# Patient Record
Sex: MALE | Race: AMERICAN INDIAN OR ALASKA NATIVE | HISPANIC OR LATINO
[De-identification: names, ages, dates, MRNs, and addresses within clinical notes are randomized per-mention and may not be internally consistent; named-entity substitution may affect disease eponyms.]

---

## 2020-02-12 ENCOUNTER — HOSPITAL (OUTPATIENT)
Dept: OTHER | Age: 25
End: 2020-02-12

## 2020-02-12 PROCEDURE — 99283 EMERGENCY DEPT VISIT LOW MDM: CPT | Performed by: EMERGENCY MEDICINE

## 2022-08-06 ENCOUNTER — APPOINTMENT (OUTPATIENT)
Dept: CT IMAGING | Facility: HOSPITAL | Age: 27
End: 2022-08-06
Attending: EMERGENCY MEDICINE

## 2022-08-06 ENCOUNTER — HOSPITAL ENCOUNTER (INPATIENT)
Facility: HOSPITAL | Age: 27
LOS: 2 days | Discharge: HOME | End: 2022-08-08
Attending: EMERGENCY MEDICINE | Admitting: INTERNAL MEDICINE

## 2022-08-06 DIAGNOSIS — K57.92 DIVERTICULITIS: Primary | ICD-10-CM

## 2022-08-06 LAB
ALBUMIN SERPL-MCNC: 3 G/DL (ref 3.4–5)
ALP LIVER SERPL-CCNC: 77 U/L
ALT SERPL-CCNC: 34 U/L
ANION GAP SERPL CALC-SCNC: 8 MMOL/L (ref 0–18)
AST SERPL-CCNC: 21 U/L (ref 15–37)
BASOPHILS # BLD AUTO: 0.05 X10(3) UL (ref 0–0.2)
BASOPHILS NFR BLD AUTO: 0.7 %
BILIRUB DIRECT SERPL-MCNC: <0.1 MG/DL (ref 0–0.2)
BILIRUB SERPL-MCNC: 0.2 MG/DL (ref 0.1–2)
BILIRUB UR QL: NEGATIVE
BUN BLD-MCNC: 7 MG/DL (ref 7–18)
BUN/CREAT SERPL: 9.6 (ref 10–20)
CALCIUM BLD-MCNC: 9.5 MG/DL (ref 8.5–10.1)
CHLORIDE SERPL-SCNC: 108 MMOL/L (ref 98–112)
CLARITY UR: CLEAR
CO2 SERPL-SCNC: 25 MMOL/L (ref 21–32)
COLOR UR: YELLOW
CREAT BLD-MCNC: 0.73 MG/DL
DEPRECATED RDW RBC AUTO: 40 FL (ref 35.1–46.3)
EOSINOPHIL # BLD AUTO: 0.29 X10(3) UL (ref 0–0.7)
EOSINOPHIL NFR BLD AUTO: 4.1 %
ERYTHROCYTE [DISTWIDTH] IN BLOOD BY AUTOMATED COUNT: 12.4 % (ref 11–15)
GFR SERPLBLD BASED ON 1.73 SQ M-ARVRAT: 127 ML/MIN/1.73M2 (ref 60–?)
GLUCOSE BLD-MCNC: 91 MG/DL (ref 70–99)
GLUCOSE UR-MCNC: NEGATIVE MG/DL
HCT VFR BLD AUTO: 42.7 %
HGB BLD-MCNC: 13.7 G/DL
HGB UR QL STRIP.AUTO: NEGATIVE
IMM GRANULOCYTES # BLD AUTO: 0.04 X10(3) UL (ref 0–1)
IMM GRANULOCYTES NFR BLD: 0.6 %
KETONES UR-MCNC: NEGATIVE MG/DL
LEUKOCYTE ESTERASE UR QL STRIP.AUTO: NEGATIVE
LIPASE SERPL-CCNC: 77 U/L (ref 73–393)
LYMPHOCYTES # BLD AUTO: 2.46 X10(3) UL (ref 1–4)
LYMPHOCYTES NFR BLD AUTO: 35.2 %
MCH RBC QN AUTO: 28.1 PG (ref 26–34)
MCHC RBC AUTO-ENTMCNC: 32.1 G/DL (ref 31–37)
MCV RBC AUTO: 87.7 FL
MONOCYTES # BLD AUTO: 0.5 X10(3) UL (ref 0.1–1)
MONOCYTES NFR BLD AUTO: 7.2 %
NEUTROPHILS # BLD AUTO: 3.65 X10 (3) UL (ref 1.5–7.7)
NEUTROPHILS # BLD AUTO: 3.65 X10(3) UL (ref 1.5–7.7)
NEUTROPHILS NFR BLD AUTO: 52.2 %
NITRITE UR QL STRIP.AUTO: NEGATIVE
OSMOLALITY SERPL CALC.SUM OF ELEC: 290 MOSM/KG (ref 275–295)
PH UR: 6 [PH] (ref 5–8)
PLATELET # BLD AUTO: 432 10(3)UL (ref 150–450)
POTASSIUM SERPL-SCNC: 3.7 MMOL/L (ref 3.5–5.1)
PROT SERPL-MCNC: 9.2 G/DL (ref 6.4–8.2)
PROT UR-MCNC: NEGATIVE MG/DL
RBC # BLD AUTO: 4.87 X10(6)UL
SARS-COV-2 RNA RESP QL NAA+PROBE: NOT DETECTED
SODIUM SERPL-SCNC: 141 MMOL/L (ref 136–145)
SP GR UR STRIP: 1.02 (ref 1–1.03)
UROBILINOGEN UR STRIP-ACNC: 0.2
WBC # BLD AUTO: 7 X10(3) UL (ref 4–11)

## 2022-08-06 PROCEDURE — 80048 BASIC METABOLIC PNL TOTAL CA: CPT | Performed by: EMERGENCY MEDICINE

## 2022-08-06 PROCEDURE — 99285 EMERGENCY DEPT VISIT HI MDM: CPT

## 2022-08-06 PROCEDURE — 80076 HEPATIC FUNCTION PANEL: CPT | Performed by: EMERGENCY MEDICINE

## 2022-08-06 PROCEDURE — 96361 HYDRATE IV INFUSION ADD-ON: CPT

## 2022-08-06 PROCEDURE — 81003 URINALYSIS AUTO W/O SCOPE: CPT | Performed by: EMERGENCY MEDICINE

## 2022-08-06 PROCEDURE — 83690 ASSAY OF LIPASE: CPT | Performed by: EMERGENCY MEDICINE

## 2022-08-06 PROCEDURE — 74177 CT ABD & PELVIS W/CONTRAST: CPT | Performed by: EMERGENCY MEDICINE

## 2022-08-06 PROCEDURE — 96365 THER/PROPH/DIAG IV INF INIT: CPT

## 2022-08-06 PROCEDURE — 85025 COMPLETE CBC W/AUTO DIFF WBC: CPT | Performed by: EMERGENCY MEDICINE

## 2022-08-06 RX ORDER — ONDANSETRON 2 MG/ML
4 INJECTION INTRAMUSCULAR; INTRAVENOUS EVERY 6 HOURS PRN
Status: DISCONTINUED | OUTPATIENT
Start: 2022-08-06 | End: 2022-08-08

## 2022-08-06 RX ORDER — DEXTROSE AND SODIUM CHLORIDE 5; .45 G/100ML; G/100ML
INJECTION, SOLUTION INTRAVENOUS CONTINUOUS
Status: DISCONTINUED | OUTPATIENT
Start: 2022-08-06 | End: 2022-08-08

## 2022-08-06 RX ORDER — IBUPROFEN 200 MG
200 TABLET ORAL EVERY 6 HOURS PRN
COMMUNITY
End: 2022-08-08

## 2022-08-06 RX ORDER — HEPARIN SODIUM 5000 [USP'U]/ML
7500 INJECTION, SOLUTION INTRAVENOUS; SUBCUTANEOUS EVERY 8 HOURS
Status: DISCONTINUED | OUTPATIENT
Start: 2022-08-06 | End: 2022-08-08

## 2022-08-06 RX ORDER — MORPHINE SULFATE 2 MG/ML
1 INJECTION, SOLUTION INTRAMUSCULAR; INTRAVENOUS EVERY 4 HOURS PRN
Status: DISCONTINUED | OUTPATIENT
Start: 2022-08-06 | End: 2022-08-08

## 2022-08-06 NOTE — ED QUICK NOTES
Orders for admission, patient is aware of plan and ready to go upstairs. Any questions, please call ED RN mandu at extension [phone].      Patient Covid vaccination status: Unvaccinated     COVID Test Ordered in ED: Rapid SARS-CoV-2 by PCR    COVID Suspicion at Admission: N/A    Running Infusions:      Mental Status/LOC at time of transport: a/ox4    Other pertinent information:   CIWA score: N/A   NIH score:  N/A

## 2022-08-07 LAB
ANION GAP SERPL CALC-SCNC: 6 MMOL/L (ref 0–18)
BUN BLD-MCNC: 7 MG/DL (ref 7–18)
BUN/CREAT SERPL: 8.5 (ref 10–20)
CALCIUM BLD-MCNC: 9 MG/DL (ref 8.5–10.1)
CHLORIDE SERPL-SCNC: 108 MMOL/L (ref 98–112)
CO2 SERPL-SCNC: 27 MMOL/L (ref 21–32)
CREAT BLD-MCNC: 0.82 MG/DL
DEPRECATED RDW RBC AUTO: 40 FL (ref 35.1–46.3)
ERYTHROCYTE [DISTWIDTH] IN BLOOD BY AUTOMATED COUNT: 12.3 % (ref 11–15)
GFR SERPLBLD BASED ON 1.73 SQ M-ARVRAT: 123 ML/MIN/1.73M2 (ref 60–?)
GLUCOSE BLD-MCNC: 113 MG/DL (ref 70–99)
HCT VFR BLD AUTO: 37.9 %
HGB BLD-MCNC: 12.2 G/DL
MCH RBC QN AUTO: 28.6 PG (ref 26–34)
MCHC RBC AUTO-ENTMCNC: 32.2 G/DL (ref 31–37)
MCV RBC AUTO: 88.8 FL
OSMOLALITY SERPL CALC.SUM OF ELEC: 291 MOSM/KG (ref 275–295)
PLATELET # BLD AUTO: 318 10(3)UL (ref 150–450)
POTASSIUM SERPL-SCNC: 3.6 MMOL/L (ref 3.5–5.1)
RBC # BLD AUTO: 4.27 X10(6)UL
SODIUM SERPL-SCNC: 141 MMOL/L (ref 136–145)
WBC # BLD AUTO: 8.6 X10(3) UL (ref 4–11)

## 2022-08-07 PROCEDURE — 85027 COMPLETE CBC AUTOMATED: CPT | Performed by: INTERNAL MEDICINE

## 2022-08-07 PROCEDURE — 80048 BASIC METABOLIC PNL TOTAL CA: CPT | Performed by: INTERNAL MEDICINE

## 2022-08-07 NOTE — PLAN OF CARE
Joseph Client is alert and oriented. He is voiding. Ambulating independently. He denies any pain or nausea/vomiting. He is receiving IVF and IV Antibiotics. Remote telemetry with No calls received. Resting comfortably at this time. Discharge plan is for home when medically cleared.

## 2022-08-08 VITALS
WEIGHT: 310.38 LBS | OXYGEN SATURATION: 97 % | BODY MASS INDEX: 44.44 KG/M2 | RESPIRATION RATE: 16 BRPM | TEMPERATURE: 98 F | SYSTOLIC BLOOD PRESSURE: 123 MMHG | DIASTOLIC BLOOD PRESSURE: 82 MMHG | HEART RATE: 62 BPM | HEIGHT: 70 IN

## 2022-08-08 RX ORDER — AMOXICILLIN AND CLAVULANATE POTASSIUM 875; 125 MG/1; MG/1
875 TABLET, FILM COATED ORAL EVERY 12 HOURS SCHEDULED
Status: DISCONTINUED | OUTPATIENT
Start: 2022-08-08 | End: 2022-08-08

## 2022-08-08 RX ORDER — AMOXICILLIN AND CLAVULANATE POTASSIUM 875; 125 MG/1; MG/1
875 TABLET, FILM COATED ORAL EVERY 12 HOURS SCHEDULED
Qty: 10 TABLET | Refills: 0 | Status: SHIPPED | OUTPATIENT
Start: 2022-08-08

## 2022-08-08 NOTE — PLAN OF CARE
Christine Christina is doing well. No pain/nausea/vomiting. Tolerating clear liquids. Receiving IVF and antibiotics IVPB. He is voiding freely and ambulating independently. All needs are within reach. He calls if needed. The plan is home with oral antibiotics if cleared by GI.

## 2022-08-08 NOTE — DIETARY NOTE
Educated pt on low fiber diet for 2-3 weeks with gradual progression to high fiber diet as tolerated. Handout and contact information provided.       [address], [address] [facility]   Clinical Dietitian [id number]

## 2022-08-08 NOTE — DISCHARGE SUMMARY
[facility]    Discharge Summary    Tammy Hall Patient Status:  Inpatient    1995 MRN [id number]   Location [facility] 4W/SW/SE Attending Florence Das MD   [facility] Day # 2 PCP No primary care provider on file. Date of Admission: 2022   Date of Discharge: 2022    Admitting Diagnosis: Diverticulitis [K57.92]    Disposition: Home    Discharge Diagnosis: . Principal Problem:    Diverticulitis      Hospital Course:   Reason for Admission: Abd pain    Discharge Physical Exam: General appearance: alert, appears stated age and cooperative  Pulmonary:  clear to auscultation bilaterally  Cardiovascular: S1, S2 normal, no murmur, click, rub or gallop, regular rate and rhythm, S1, S2 normal  Abdominal: soft, non-tender; bowel sounds normal; no masses,  no organomegaly  Extremities: extremities normal, atraumatic, no cyanosis or edema  Pulses: 2+ and symmetric  Skin: Skin color, texture, turgor normal. No rashes or lesions  Neurologic: Grossly normal    Hospital Course: Pt started on IV abx for diverticulitis doing better , advance diet as tolerated and DC home    Complications: None    Consultants  Chat With All Active Members    Provider Role Specialty    Poncho Cevallos MD  Consulting Physician  Silas Varghese MD  Consulting Physician  Kiko Le MD  Consulting Physician  Corie Aguilera MD  Consulting Physician  Corinne Hardy MD Consulting Physician  Internal Medicine    Elsie Morley MD  Consulting Physician  SURGERY, PLASTIC              Discharge Plan:   Discharge Condition: Stable    Current Discharge Medication List            Discharge Diet: As tolerated    Discharge Activity: As tolerated    Follow up:                Emre Jacobs MD, Cortes Little MD  2022

## 2022-09-01 ENCOUNTER — HOSPITAL ENCOUNTER (EMERGENCY)
Facility: HOSPITAL | Age: 27
Discharge: HOME | End: 2022-09-01
Attending: EMERGENCY MEDICINE

## 2022-09-01 VITALS
RESPIRATION RATE: 16 BRPM | SYSTOLIC BLOOD PRESSURE: 184 MMHG | HEART RATE: 102 BPM | OXYGEN SATURATION: 95 % | TEMPERATURE: 98 F | DIASTOLIC BLOOD PRESSURE: 79 MMHG

## 2022-09-01 DIAGNOSIS — N30.01 ACUTE CYSTITIS WITH HEMATURIA: Primary | ICD-10-CM

## 2022-09-01 LAB
BILIRUB UR QL: NEGATIVE
GLUCOSE UR-MCNC: NEGATIVE MG/DL
KETONES UR-MCNC: NEGATIVE MG/DL
NITRITE UR QL STRIP.AUTO: NEGATIVE
PH UR: 6 [PH] (ref 5–8)
RBC #/AREA URNS AUTO: >10 /HPF
RBC #/AREA URNS AUTO: >10 /HPF
SP GR UR STRIP: 1.02 (ref 1–1.03)
UROBILINOGEN UR STRIP-ACNC: 0.2
WBC #/AREA URNS AUTO: >50 /HPF
WBC #/AREA URNS AUTO: >50 /HPF
WBC CLUMPS UR QL AUTO: PRESENT /HPF
WBC CLUMPS UR QL AUTO: PRESENT /HPF

## 2022-09-01 PROCEDURE — 81015 MICROSCOPIC EXAM OF URINE: CPT | Performed by: EMERGENCY MEDICINE

## 2022-09-01 PROCEDURE — 87077 CULTURE AEROBIC IDENTIFY: CPT | Performed by: EMERGENCY MEDICINE

## 2022-09-01 PROCEDURE — 87491 CHLMYD TRACH DNA AMP PROBE: CPT | Performed by: EMERGENCY MEDICINE

## 2022-09-01 PROCEDURE — 99283 EMERGENCY DEPT VISIT LOW MDM: CPT

## 2022-09-01 PROCEDURE — 87186 SC STD MICRODIL/AGAR DIL: CPT | Performed by: EMERGENCY MEDICINE

## 2022-09-01 PROCEDURE — 87086 URINE CULTURE/COLONY COUNT: CPT | Performed by: EMERGENCY MEDICINE

## 2022-09-01 PROCEDURE — 87591 N.GONORRHOEAE DNA AMP PROB: CPT | Performed by: EMERGENCY MEDICINE

## 2022-09-01 PROCEDURE — 81001 URINALYSIS AUTO W/SCOPE: CPT | Performed by: EMERGENCY MEDICINE

## 2022-09-01 RX ORDER — CEPHALEXIN 500 MG/1
500 CAPSULE ORAL 3 TIMES DAILY
Qty: 21 CAPSULE | Refills: 0 | Status: SHIPPED | OUTPATIENT
Start: 2022-09-01 | End: 2022-09-08

## 2022-09-01 NOTE — ED INITIAL ASSESSMENT (HPI)
Mena Rogers is here for evaluation of urinary frequency starting yesterday. Today he noticed pink-tinged urine, painful urination, and mucus in his urine.

## 2022-09-02 LAB
C TRACH DNA SPEC QL NAA+PROBE: NEGATIVE
N GONORRHOEA DNA SPEC QL NAA+PROBE: NEGATIVE

## 2023-01-05 ENCOUNTER — HOSPITAL ENCOUNTER (INPATIENT)
Facility: HOSPITAL | Age: 28
LOS: 2 days | Discharge: HOME | End: 2023-01-08
Attending: HOSPITALIST | Admitting: HOSPITALIST

## 2023-01-05 ENCOUNTER — HOSPITAL ENCOUNTER (INPATIENT)
Facility: HOSPITAL | Age: 28
LOS: 2 days | Discharge: HOME | End: 2023-01-08
Attending: HOSPITALIST

## 2023-01-05 DIAGNOSIS — K57.92 ACUTE DIVERTICULITIS: Primary | ICD-10-CM

## 2023-01-05 LAB
BASOPHILS # BLD AUTO: 0.08 X10(3) UL (ref 0–0.2)
BASOPHILS NFR BLD AUTO: 0.6 %
BILIRUB UR QL: NEGATIVE
CLARITY UR: CLEAR
COLOR UR: YELLOW
DEPRECATED RDW RBC AUTO: 38.5 FL (ref 35.1–46.3)
EOSINOPHIL # BLD AUTO: 0.35 X10(3) UL (ref 0–0.7)
EOSINOPHIL NFR BLD AUTO: 2.7 %
ERYTHROCYTE [DISTWIDTH] IN BLOOD BY AUTOMATED COUNT: 12.7 % (ref 11–15)
GLUCOSE UR-MCNC: NEGATIVE MG/DL
HCT VFR BLD AUTO: 45.6 %
HGB BLD-MCNC: 15.5 G/DL
HGB UR QL STRIP.AUTO: NEGATIVE
IMM GRANULOCYTES # BLD AUTO: 0.05 X10(3) UL (ref 0–1)
IMM GRANULOCYTES NFR BLD: 0.4 %
KETONES UR-MCNC: NEGATIVE MG/DL
LEUKOCYTE ESTERASE UR QL STRIP.AUTO: NEGATIVE
LYMPHOCYTES # BLD AUTO: 3.1 X10(3) UL (ref 1–4)
LYMPHOCYTES NFR BLD AUTO: 24.1 %
MCH RBC QN AUTO: 28.2 PG (ref 26–34)
MCHC RBC AUTO-ENTMCNC: 34 G/DL (ref 31–37)
MCV RBC AUTO: 83.1 FL
MONOCYTES # BLD AUTO: 0.57 X10(3) UL (ref 0.1–1)
MONOCYTES NFR BLD AUTO: 4.4 %
NEUTROPHILS # BLD AUTO: 8.71 X10 (3) UL (ref 1.5–7.7)
NEUTROPHILS # BLD AUTO: 8.71 X10(3) UL (ref 1.5–7.7)
NEUTROPHILS NFR BLD AUTO: 67.8 %
NITRITE UR QL STRIP.AUTO: NEGATIVE
PH UR: 5 [PH] (ref 5–8)
PLATELET # BLD AUTO: 404 10(3)UL (ref 150–450)
PROT UR-MCNC: NEGATIVE MG/DL
RBC # BLD AUTO: 5.49 X10(6)UL
SP GR UR STRIP: 1.02 (ref 1–1.03)
UROBILINOGEN UR STRIP-ACNC: <2
VIT C UR-MCNC: NEGATIVE MG/DL
WBC # BLD AUTO: 12.9 X10(3) UL (ref 4–11)

## 2023-01-06 ENCOUNTER — APPOINTMENT (OUTPATIENT)
Dept: CT IMAGING | Facility: HOSPITAL | Age: 28
End: 2023-01-06
Attending: NURSE PRACTITIONER

## 2023-01-06 PROBLEM — K57.92 ACUTE DIVERTICULITIS: Status: ACTIVE | Noted: 2023-01-06

## 2023-01-06 LAB
ALBUMIN SERPL-MCNC: 3.7 G/DL (ref 3.4–5)
ALBUMIN/GLOB SERPL: 0.6 {RATIO} (ref 1–2)
ALP LIVER SERPL-CCNC: 79 U/L
ALT SERPL-CCNC: 32 U/L
ANION GAP SERPL CALC-SCNC: 9 MMOL/L (ref 0–18)
AST SERPL-CCNC: 22 U/L (ref 15–37)
BILIRUB SERPL-MCNC: 0.6 MG/DL (ref 0.1–2)
BUN BLD-MCNC: 8 MG/DL (ref 7–18)
BUN/CREAT SERPL: 9.2 (ref 10–20)
CALCIUM BLD-MCNC: 9.6 MG/DL (ref 8.5–10.1)
CHLORIDE SERPL-SCNC: 103 MMOL/L (ref 98–112)
CO2 SERPL-SCNC: 25 MMOL/L (ref 21–32)
CREAT BLD-MCNC: 0.87 MG/DL
GFR SERPLBLD BASED ON 1.73 SQ M-ARVRAT: 121 ML/MIN/1.73M2 (ref 60–?)
GLOBULIN PLAS-MCNC: 5.8 G/DL (ref 2.8–4.4)
GLUCOSE BLD-MCNC: 97 MG/DL (ref 70–99)
LIPASE SERPL-CCNC: 89 U/L (ref 73–393)
OSMOLALITY SERPL CALC.SUM OF ELEC: 282 MOSM/KG (ref 275–295)
POTASSIUM SERPL-SCNC: 3.4 MMOL/L (ref 3.5–5.1)
PROT SERPL-MCNC: 9.5 G/DL (ref 6.4–8.2)
SARS-COV-2 RNA RESP QL NAA+PROBE: NOT DETECTED
SODIUM SERPL-SCNC: 137 MMOL/L (ref 136–145)

## 2023-01-06 PROCEDURE — 99222 1ST HOSP IP/OBS MODERATE 55: CPT | Performed by: HOSPITALIST

## 2023-01-06 PROCEDURE — 74177 CT ABD & PELVIS W/CONTRAST: CPT | Performed by: NURSE PRACTITIONER

## 2023-01-06 PROCEDURE — 99254 IP/OBS CNSLTJ NEW/EST MOD 60: CPT | Performed by: SURGERY

## 2023-01-06 PROCEDURE — 99254 IP/OBS CNSLTJ NEW/EST MOD 60: CPT | Performed by: INTERNAL MEDICINE

## 2023-01-06 RX ORDER — POTASSIUM CHLORIDE 20 MEQ/1
40 TABLET, EXTENDED RELEASE ORAL ONCE
Status: COMPLETED | OUTPATIENT
Start: 2023-01-06 | End: 2023-01-06

## 2023-01-06 RX ORDER — HEPARIN SODIUM 5000 [USP'U]/ML
7500 INJECTION, SOLUTION INTRAVENOUS; SUBCUTANEOUS EVERY 12 HOURS SCHEDULED
Status: DISCONTINUED | OUTPATIENT
Start: 2023-01-06 | End: 2023-01-08

## 2023-01-06 RX ORDER — HYDROMORPHONE HYDROCHLORIDE 1 MG/ML
0.5 INJECTION, SOLUTION INTRAMUSCULAR; INTRAVENOUS; SUBCUTANEOUS
Status: DISCONTINUED | OUTPATIENT
Start: 2023-01-06 | End: 2023-01-07

## 2023-01-06 RX ORDER — ACETAMINOPHEN 325 MG/1
650 TABLET ORAL EVERY 6 HOURS PRN
COMMUNITY
End: 2023-01-08

## 2023-01-06 RX ORDER — SODIUM CHLORIDE 9 MG/ML
INJECTION, SOLUTION INTRAVENOUS CONTINUOUS
Status: DISCONTINUED | OUTPATIENT
Start: 2023-01-06 | End: 2023-01-08

## 2023-01-06 RX ORDER — PROCHLORPERAZINE EDISYLATE 5 MG/ML
5 INJECTION INTRAMUSCULAR; INTRAVENOUS EVERY 8 HOURS PRN
Status: DISCONTINUED | OUTPATIENT
Start: 2023-01-06 | End: 2023-01-08

## 2023-01-06 RX ORDER — ONDANSETRON 2 MG/ML
4 INJECTION INTRAMUSCULAR; INTRAVENOUS EVERY 6 HOURS PRN
Status: DISCONTINUED | OUTPATIENT
Start: 2023-01-06 | End: 2023-01-08

## 2023-01-06 RX ORDER — HYDROMORPHONE HYDROCHLORIDE 1 MG/ML
0.3 INJECTION, SOLUTION INTRAMUSCULAR; INTRAVENOUS; SUBCUTANEOUS EVERY 4 HOURS PRN
Status: DISCONTINUED | OUTPATIENT
Start: 2023-01-06 | End: 2023-01-07

## 2023-01-06 NOTE — ED QUICK NOTES
Orders for admission, patient is aware of plan and ready to go upstairs. Any questions, please call ED RN Marita at extension [phone].      Patient Covid vaccination status: Unvaccinated     COVID Test Ordered in ED: Rapid SARS-CoV-2 by PCR    COVID Suspicion at Admission: N/A    Running Infusions:  None    Mental Status/LOC at time of transport: AOx4    Other pertinent information:   CIWA score: N/A   NIH score:  N/A

## 2023-01-06 NOTE — ED INITIAL ASSESSMENT (HPI)
Pt c/o of lower abdominal pain x 1 week no diarrhea reports feels like hes having a diverticulitis flare up

## 2023-01-06 NOTE — H&P
Robert Agapito    PATIENT'S NAME: Osbaldo Harding   ATTENDING PHYSICIAN: Yordan Castañeda MD   PATIENT ACCOUNT#:   [de-identified]    LOCATION:  [address] [place] [address] Road #:   [id number]       YOB: 1995  ADMISSION DATE:       01/05/2023    HISTORY AND PHYSICAL EXAMINATION    DATE OF EXAMINATION:  01/06/2023    CHIEF COMPLAINT:  Abdominal pain and constipation. 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 He was admitted to our hospital in August of this year with diverticulitis. He was treated with IV antibiotics and gradually improved. He was to follow up with GI for scope as an outpatient but because of his lack of health insurance, he is unable to afford it. He did not follow up and he says since that time he has had recurrent episodes of left lower quadrant abdominal pain. He said when they would occur, he would decrease his p.o. intake and typically the discomfort would improve within 1 day or 2. However, at this time he has been suffering with the pain for the last week or more. It has been across his lower abdomen, primarily in the left side but radiating to his back as well. He has had no fever or chills. No nausea or vomiting. He said he was constipated over the last couple of days. He feels bloated. He said 1 week ago he started changing his diet to a more bland diet; however, when he did not improve, he stopped eating altogether and said he has basically had no p.o. intake over the last 3 days, but the symptoms have continued to persist.  He received pain medications in the emergency room and when I saw him, he was quite comfortable. Evaluation in the ER included a CT scan of the abdomen and pelvis which revealed sigmoid diverticulitis with marked inflammatory changes and a sinus tract without definite fistulization. There was secondary bladder inflammation and moderate left hydronephrosis/hydroureter. Labs included a glucose of 97, sodium of 137, potassium of 3.4, chloride 103, CO2 of 25, BUN of 8 with a creatinine of 0.87, calcium 9.6, anion gap of 9. Liver function tests were essentially normal except for an elevated globulin with an elevated total protein of 9.5. White count was 12.9 with a hemoglobin of 15 and a platelet count of 986,160. There were 68% neutrophils. Urinalysis was clear without signs of infection. He was started on IV Zosyn in the emergency room and admitted for further evaluation and treatment. PAST MEDICAL HISTORY:  Significant for diverticulosis with episode of diverticulitis in 2022. At that time, the patient's labs revealed a perforated left lower quadrant diverticulitis. He was seen by GI and Surgical Services at that time and managed conservatively. He has had recurrent episodes of abdominal pain, which he felt was most likely related to his diverticulitis, but he has managed this with altering his diet for 1 day or 2 in the past.  COVID-19 pneumonia in , hospitalized for 3 weeks at [facility].  The patient denies any surgery and specifically denies any history of diabetes, high blood pressure, asthma or heart problems. He likely has obstructive sleep apnea. He has a BMI of 43 and snores when he sleeps. MEDICATIONS:  Prior to admission none, except Tylenol 325 mg. Patient was taking 2 every 6 hours as needed for pain. ALLERGIES:  No known drug allergies. FAMILY HISTORY:  His mother is 64 and had a CVA in the past and hypertension. His father  at 61 of sepsis. He also had hypertension and diabetes. His aunt on his father's side had breast cancer. SOCIAL HISTORY:  The patient does not smoke currently. He apparently smoked briefly as a teenager. Drinks alcohol about once a month. No history of drug use. 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     REVIEW OF SYSTEMS:  Twelve systems were reviewed. The patient reports that until 1 week ago, his appetite had been good. He denies any fever or chills. No urinary symptoms. No diarrhea, melena, or hematochezia. He has been constipated over the last couple of days. There are otherwise no additional pertinent positives or negatives on the 12-point review of systems except as listed in the History of Present Illness. PHYSICAL EXAMINATION:    VITAL SIGNS:  Temperature was 97.8, pulse 60, respiratory rate 20, blood pressure 136/88, O2 saturation 97% on room air. HEENT:  Normocephalic, atraumatic. Pupils equal, reactive. Sclerae anicteric. There is no sinus tenderness. Mucous membranes were moist.  NECK:  Supple. Oropharynx was crowded. LUNGS:  Decreased breath sounds bilaterally secondary to body habitus but easy respiratory excursions. HEART:  Regular rate and rhythm. Normal S1, S2.  ABDOMEN:  Obese, soft. Bowel sounds were hypoactive. There may have been some mild distention. Difficult to tell because of the patient's body habitus. There was tenderness to palpation primarily in the left lower quadrant without guarding or rebound. EXTREMITIES:  No clubbing, cyanosis, calf tenderness, palpable cords, or edema. SKIN:  Warm and dry without any significant rashes. There were multiple tattoos. NEUROLOGIC:  The patient was awake, alert, oriented x3 with no focal motor or sensory deficits. PSYCHIATRIC:  His mood and affect were pleasant and cooperative. BACK:  There was no costovertebral angle tenderness. LABORATORY DATA:  Previously mentioned. ASSESSMENT AND PLAN:    1. Diverticulitis with fistulous tract. No obvious abscess. Continue IV Zosyn which was started in the emergency room. Await evaluation by GI and Surgery. As no obvious fluid collection was seen, I do not think Interventional Radiology can be of much assistance here.   Unfortunately, this poor gentleman is without health insurance and is very concerned about the cost of everything. We will ask Social Work to evaluate as well. 2.   Left hydroureter/hydronephrosis, appears to be secondary to the inflammatory colonic process. The patient's BUN and creatinine are normal.  He has had no issues with urinating and urinalysis is clear. There does not appear to be any kind of a fistulous tract to the bladder at this point. Continue to monitor closely. May require urology evaluation. 3.   Body mass index of 43. Suspect underlying sleep apnea. Patient is not in a position to afford sleep study as an outpatient. We will however place on continuous pulse ox while in the hospital, so that oxygen can be given at night or when the patient is sleeping if necessary. Try to limit narcotics if possible. 4.   DVT prophylaxis. Subcutaneous heparin. 5.   The patient's current clinical status and proposed treatment plan was discussed with him. All of his questions were answered, and he agreed with the plan of care as outlined above.      Dictated By Rene Antunez MD  d: 01/06/2023 06:23:20  t: 01/06/2023 06:32:45  Job [id number]  [facility]/

## 2023-01-06 NOTE — PLAN OF CARE
New admit from ED this morning. Patient c/o lower abdominal pain, dilaudid given. Potassium replaced. IVF at 125 ml/hr. Vitals stable. Continue to monitor. Problem: Patient Centered Care  Goal: Patient preferences are identified and integrated in the patient's plan of care  Description: Interventions:  - What would you like us to know as we care for you?  - Provide timely, complete, and accurate information to patient/family  - Incorporate patient and family knowledge, values, beliefs, and cultural backgrounds into the planning and delivery of care  - Encourage patient/family to participate in care and decision-making at the level they choose  - Honor patient and family perspectives and choices  Outcome: Progressing     Problem: PAIN - ADULT  Goal: Verbalizes/displays adequate comfort level or patient's stated pain goal  Description: INTERVENTIONS:  - Encourage pt to monitor pain and request assistance  - Assess pain using appropriate pain scale  - Administer analgesics based on type and severity of pain and evaluate response  - Implement non-pharmacological measures as appropriate and evaluate response  - Consider cultural and social influences on pain and pain management  - Manage/alleviate anxiety  - Utilize distraction and/or relaxation techniques  - Monitor for opioid side effects  - Notify MD/LIP if interventions unsuccessful or patient reports new pain  - Anticipate increased pain with activity and pre-medicate as appropriate  Outcome: Progressing     Problem: SAFETY ADULT - FALL  Goal: Free from fall injury  Description: INTERVENTIONS:  - Assess pt frequently for physical needs  - Identify cognitive and physical deficits and behaviors that affect risk of falls.   - [place] fall precautions as indicated by assessment.  - Educate pt/family on patient safety including physical limitations  - Instruct pt to call for assistance with activity based on assessment  - Modify environment to reduce risk of injury  - Provide assistive devices as appropriate  - Consider OT/PT consult to assist with strengthening/mobility  - Encourage toileting schedule  Outcome: Progressing     Problem: GASTROINTESTINAL - ADULT  Goal: Minimal or absence of nausea and vomiting  Description: INTERVENTIONS:  - Maintain adequate hydration with IV or PO as ordered and tolerated  - Nasogastric tube to low intermittent suction as ordered  - Evaluate effectiveness of ordered antiemetic medications  - Provide nonpharmacologic comfort measures as appropriate  - Advance diet as tolerated, if ordered  - Obtain nutritional consult as needed  - Evaluate fluid balance  Outcome: Progressing  Goal: Maintains or returns to baseline bowel function  Description: INTERVENTIONS:  - Assess bowel function  - Maintain adequate hydration with IV or PO as ordered and tolerated  - Evaluate effectiveness of GI medications  - Encourage mobilization and activity  - Obtain nutritional consult as needed  - Establish a toileting routine/schedule  - Consider collaborating with pharmacy to review patient's medication profile  Outcome: Progressing  Goal: Maintains adequate nutritional intake (undernourished)  Description: INTERVENTIONS:  - Monitor percentage of each meal consumed  - Identify factors contributing to decreased intake, treat as appropriate  - Assist with meals as needed  - Monitor I&O, WT and lab values  - Obtain nutritional consult as needed  - Optimize oral hygiene and moisture  - Encourage food from home; allow for food preferences  - Enhance eating environment  Outcome: Progressing     Problem: METABOLIC/FLUID AND ELECTROLYTES - ADULT  Goal: Electrolytes maintained within normal limits  Description: INTERVENTIONS:  - Monitor labs and rhythm and assess patient for signs and symptoms of electrolyte imbalances  - Administer electrolyte replacement as ordered  - Monitor response to electrolyte replacements, including rhythm and repeat lab results as appropriate  - Fluid restriction as ordered  - Instruct patient on fluid and nutrition restrictions as appropriate  Outcome: Progressing     Problem: SKIN/TISSUE INTEGRITY - ADULT  Goal: Skin integrity remains intact  Description: INTERVENTIONS  - Assess and document risk factors for pressure ulcer development  - Assess and document skin integrity  - Monitor for areas of redness and/or skin breakdown  - Initiate interventions, skin care algorithm/standards of care as needed  Outcome: Progressing

## 2023-01-06 NOTE — PROGRESS NOTES
27M with a PMH of recurrent colon diverticulitis who presents with lower abdominal pain x 1 week. Urology consulted for left hydronephrosis and hydroureter on CT scan. UA negative. CT AP + IVC 1/6/23: acute sigmoid diverticulitis with possible fistula. Interval development of moderate left hydronephrosis down to the level of the inflamed sigmoid colon with asymmetric bladder wall thickening. Patient is currently hemodynamically stable, pain has improved since admission, and primary [facility] plans for Gen Surg consultation. I reviewed images. Agree with continuing current medical therapy for diverticulitis and expectant mgmt at this time for hydronephrosis as this should hopefully improve once diverticulitis is addressed.  will be on standby for pre-op cysto, left ureteral stent if general surgery plans for surgical intervention. Full  consult note to follow.

## 2023-01-06 NOTE — CM/SW NOTE
CECIL received MDO for assistance due to pt not having any insurance. CECIL notified [facility] ([id number]), left voicemail. CECIL emailed Lawerhari Heard at [address].           CECIL/RODGER to remain available for support and/or discharge planning.      HILLARY Perez, LSW   x [phone]

## 2023-01-07 LAB
ALBUMIN SERPL-MCNC: 3 G/DL (ref 3.4–5)
ALBUMIN/GLOB SERPL: 0.6 {RATIO} (ref 1–2)
ALP LIVER SERPL-CCNC: 66 U/L
ALT SERPL-CCNC: 28 U/L
ANION GAP SERPL CALC-SCNC: 5 MMOL/L (ref 0–18)
AST SERPL-CCNC: 18 U/L (ref 15–37)
BASOPHILS # BLD AUTO: 0.05 X10(3) UL (ref 0–0.2)
BASOPHILS NFR BLD AUTO: 0.5 %
BILIRUB SERPL-MCNC: 0.5 MG/DL (ref 0.1–2)
BUN BLD-MCNC: 8 MG/DL (ref 7–18)
BUN/CREAT SERPL: 8.8 (ref 10–20)
CALCIUM BLD-MCNC: 8.9 MG/DL (ref 8.5–10.1)
CHLORIDE SERPL-SCNC: 108 MMOL/L (ref 98–112)
CO2 SERPL-SCNC: 28 MMOL/L (ref 21–32)
CREAT BLD-MCNC: 0.91 MG/DL
DEPRECATED RDW RBC AUTO: 38.6 FL (ref 35.1–46.3)
EOSINOPHIL # BLD AUTO: 0.32 X10(3) UL (ref 0–0.7)
EOSINOPHIL NFR BLD AUTO: 3.3 %
ERYTHROCYTE [DISTWIDTH] IN BLOOD BY AUTOMATED COUNT: 12.6 % (ref 11–15)
GFR SERPLBLD BASED ON 1.73 SQ M-ARVRAT: 118 ML/MIN/1.73M2 (ref 60–?)
GLOBULIN PLAS-MCNC: 5.1 G/DL (ref 2.8–4.4)
GLUCOSE BLD-MCNC: 80 MG/DL (ref 70–99)
HCT VFR BLD AUTO: 39.3 %
HGB BLD-MCNC: 13.1 G/DL
IMM GRANULOCYTES # BLD AUTO: 0.05 X10(3) UL (ref 0–1)
IMM GRANULOCYTES NFR BLD: 0.5 %
LYMPHOCYTES # BLD AUTO: 2.75 X10(3) UL (ref 1–4)
LYMPHOCYTES NFR BLD AUTO: 28.6 %
MAGNESIUM SERPL-MCNC: 2.1 MG/DL (ref 1.6–2.6)
MCH RBC QN AUTO: 28.4 PG (ref 26–34)
MCHC RBC AUTO-ENTMCNC: 33.3 G/DL (ref 31–37)
MCV RBC AUTO: 85.2 FL
MONOCYTES # BLD AUTO: 0.51 X10(3) UL (ref 0.1–1)
MONOCYTES NFR BLD AUTO: 5.3 %
NEUTROPHILS # BLD AUTO: 5.93 X10 (3) UL (ref 1.5–7.7)
NEUTROPHILS # BLD AUTO: 5.93 X10(3) UL (ref 1.5–7.7)
NEUTROPHILS NFR BLD AUTO: 61.8 %
OSMOLALITY SERPL CALC.SUM OF ELEC: 289 MOSM/KG (ref 275–295)
PHOSPHATE SERPL-MCNC: 3.5 MG/DL (ref 2.5–4.9)
PLATELET # BLD AUTO: 333 10(3)UL (ref 150–450)
POTASSIUM SERPL-SCNC: 3.5 MMOL/L (ref 3.5–5.1)
POTASSIUM SERPL-SCNC: 3.5 MMOL/L (ref 3.5–5.1)
PROT SERPL-MCNC: 8.1 G/DL (ref 6.4–8.2)
RBC # BLD AUTO: 4.61 X10(6)UL
SODIUM SERPL-SCNC: 141 MMOL/L (ref 136–145)
WBC # BLD AUTO: 9.6 X10(3) UL (ref 4–11)

## 2023-01-07 PROCEDURE — 99232 SBSQ HOSP IP/OBS MODERATE 35: CPT | Performed by: INTERNAL MEDICINE

## 2023-01-07 PROCEDURE — 99222 1ST HOSP IP/OBS MODERATE 55: CPT | Performed by: UROLOGY

## 2023-01-07 PROCEDURE — 99232 SBSQ HOSP IP/OBS MODERATE 35: CPT | Performed by: SURGERY

## 2023-01-07 PROCEDURE — 99233 SBSQ HOSP IP/OBS HIGH 50: CPT | Performed by: HOSPITALIST

## 2023-01-07 RX ORDER — HYDROCODONE BITARTRATE AND ACETAMINOPHEN 5; 325 MG/1; MG/1
1 TABLET ORAL EVERY 4 HOURS PRN
Status: DISCONTINUED | OUTPATIENT
Start: 2023-01-07 | End: 2023-01-08

## 2023-01-07 NOTE — PLAN OF CARE
No acute changes in the patient's status at this time. Patient reporting some mild abdominal discomfort. He reports not having eaten anything for the last 4 days and said he he has an appetite and is hungry. Dilaudid given x1 with adequate pain relief per the patient's report. IVF and IV antibiotics continued as ordered. Patient has been self care in the room. Problem: Patient Centered Care  Goal: Patient preferences are identified and integrated in the patient's plan of care  Description: Interventions:  - What would you like us to know as we care for you?  I live at home with my partner and my daughter.  - Provide timely, complete, and accurate information to patient/family  - Incorporate patient and family knowledge, values, beliefs, and cultural backgrounds into the planning and delivery of care  - Encourage patient/family to participate in care and decision-making at the level they choose  - Honor patient and family perspectives and choices  Outcome: Progressing     Problem: PAIN - ADULT  Goal: Verbalizes/displays adequate comfort level or patient's stated pain goal  Description: INTERVENTIONS:  - Encourage pt to monitor pain and request assistance  - Assess pain using appropriate pain scale  - Administer analgesics based on type and severity of pain and evaluate response  - Implement non-pharmacological measures as appropriate and evaluate response  - Consider cultural and social influences on pain and pain management  - Manage/alleviate anxiety  - Utilize distraction and/or relaxation techniques  - Monitor for opioid side effects  - Notify MD/LIP if interventions unsuccessful or patient reports new pain  - Anticipate increased pain with activity and pre-medicate as appropriate  Outcome: Progressing     Problem: SAFETY ADULT - FALL  Goal: Free from fall injury  Description: INTERVENTIONS:  - Assess pt frequently for physical needs  - Identify cognitive and physical deficits and behaviors that affect risk of falls.  - [place] fall precautions as indicated by assessment.  - Educate pt/family on patient safety including physical limitations  - Instruct pt to call for assistance with activity based on assessment  - Modify environment to reduce risk of injury  - Provide assistive devices as appropriate  - Consider OT/PT consult to assist with strengthening/mobility  - Encourage toileting schedule  Outcome: Progressing     Problem: GASTROINTESTINAL - ADULT  Goal: Minimal or absence of nausea and vomiting  Description: INTERVENTIONS:  - Maintain adequate hydration with IV or PO as ordered and tolerated  - Nasogastric tube to low intermittent suction as ordered  - Evaluate effectiveness of ordered antiemetic medications  - Provide nonpharmacologic comfort measures as appropriate  - Advance diet as tolerated, if ordered  - Obtain nutritional consult as needed  - Evaluate fluid balance  Outcome: Progressing  Goal: Maintains or returns to baseline bowel function  Description: INTERVENTIONS:  - Assess bowel function  - Maintain adequate hydration with IV or PO as ordered and tolerated  - Evaluate effectiveness of GI medications  - Encourage mobilization and activity  - Obtain nutritional consult as needed  - Establish a toileting routine/schedule  - Consider collaborating with pharmacy to review patient's medication profile  Outcome: Progressing  Goal: Maintains adequate nutritional intake (undernourished)  Description: INTERVENTIONS:  - Monitor percentage of each meal consumed  - Identify factors contributing to decreased intake, treat as appropriate  - Assist with meals as needed  - Monitor I&O, WT and lab values  - Obtain nutritional consult as needed  - Optimize oral hygiene and moisture  - Encourage food from home; allow for food preferences  - Enhance eating environment  Outcome: Progressing     Problem: METABOLIC/FLUID AND ELECTROLYTES - ADULT  Goal: Electrolytes maintained within normal limits  Description: INTERVENTIONS:  - Monitor labs and rhythm and assess patient for signs and symptoms of electrolyte imbalances  - Administer electrolyte replacement as ordered  - Monitor response to electrolyte replacements, including rhythm and repeat lab results as appropriate  - Fluid restriction as ordered  - Instruct patient on fluid and nutrition restrictions as appropriate  Outcome: Progressing     Problem: SKIN/TISSUE INTEGRITY - ADULT  Goal: Skin integrity remains intact  Description: INTERVENTIONS  - Assess and document risk factors for pressure ulcer development  - Assess and document skin integrity  - Monitor for areas of redness and/or skin breakdown  - Initiate interventions, skin care algorithm/standards of care as needed  Outcome: Progressing

## 2023-01-07 NOTE — PLAN OF CARE
Advanced to full liquid diet per order,  Diet advanced to soft. Given norco for complaints of back and mild abd pain. IVF maintained. Patient had an unwitnessed 1 soft bm reported today. Problem: Patient Centered Care  Goal: Patient preferences are identified and integrated in the patient's plan of care  Description: Interventions:  - What would you like us to know as we care for you? Had a work injury 10 years that resulted in surgery and back pain.   - Provide timely, complete, and accurate information to patient/family  - Incorporate patient and family knowledge, values, beliefs, and cultural backgrounds into the planning and delivery of care  - Encourage patient/family to participate in care and decision-making at the level they choose  - Honor patient and family perspectives and choices  Outcome: Progressing     Problem: PAIN - ADULT  Goal: Verbalizes/displays adequate comfort level or patient's stated pain goal  Description: INTERVENTIONS:  - Encourage pt to monitor pain and request assistance  - Assess pain using appropriate pain scale  - Administer analgesics based on type and severity of pain and evaluate response  - Implement non-pharmacological measures as appropriate and evaluate response  - Consider cultural and social influences on pain and pain management  - Manage/alleviate anxiety  - Utilize distraction and/or relaxation techniques  - Monitor for opioid side effects  - Notify MD/LIP if interventions unsuccessful or patient reports new pain  - Anticipate increased pain with activity and pre-medicate as appropriate  Outcome: Progressing     Problem: GASTROINTESTINAL - ADULT  Goal: Minimal or absence of nausea and vomiting  Description: INTERVENTIONS:  - Maintain adequate hydration with IV or PO as ordered and tolerated  - Nasogastric tube to low intermittent suction as ordered  - Evaluate effectiveness of ordered antiemetic medications  - Provide nonpharmacologic comfort measures as appropriate  - Advance diet as tolerated, if ordered  - Obtain nutritional consult as needed  - Evaluate fluid balance  Outcome: Progressing

## 2023-01-08 VITALS
OXYGEN SATURATION: 98 % | WEIGHT: 296.88 LBS | RESPIRATION RATE: 16 BRPM | BODY MASS INDEX: 43.97 KG/M2 | DIASTOLIC BLOOD PRESSURE: 71 MMHG | SYSTOLIC BLOOD PRESSURE: 119 MMHG | HEART RATE: 57 BPM | TEMPERATURE: 98 F | HEIGHT: 69 IN

## 2023-01-08 PROCEDURE — 99231 SBSQ HOSP IP/OBS SF/LOW 25: CPT | Performed by: INTERNAL MEDICINE

## 2023-01-08 PROCEDURE — 99239 HOSP IP/OBS DSCHRG MGMT >30: CPT | Performed by: HOSPITALIST

## 2023-01-08 PROCEDURE — 99232 SBSQ HOSP IP/OBS MODERATE 35: CPT | Performed by: SURGERY

## 2023-01-08 RX ORDER — HYDROCODONE BITARTRATE AND ACETAMINOPHEN 5; 325 MG/1; MG/1
1 TABLET ORAL EVERY 6 HOURS PRN
Qty: 10 TABLET | Refills: 0 | Status: SHIPPED | OUTPATIENT
Start: 2023-01-08

## 2023-01-08 RX ORDER — AMOXICILLIN AND CLAVULANATE POTASSIUM 875; 125 MG/1; MG/1
1 TABLET, FILM COATED ORAL 2 TIMES DAILY
Qty: 28 TABLET | Refills: 0 | Status: SHIPPED | OUTPATIENT
Start: 2023-01-08 | End: 2023-01-22

## 2023-01-08 NOTE — PLAN OF CARE
No acute changes in the patient's status overnight. Patient reported that his appetite is back and he is anticipating ordering breakfast very early and is hopeful for discharge later this morning. Patient noted some mild abdominal discomfort, but did not want any prn pain medications. He is in good spirits and pleasant. He has been independent and ambulatory within his room. Problem: Patient Centered Care  Goal: Patient preferences are identified and integrated in the patient's plan of care  Description: Interventions:  - What would you like us to know as we care for you?  I live at home with my family.  - Provide timely, complete, and accurate information to patient/family  - Incorporate patient and family knowledge, values, beliefs, and cultural backgrounds into the planning and delivery of care  - Encourage patient/family to participate in care and decision-making at the level they choose  - Honor patient and family perspectives and choices  Outcome: Progressing     Problem: PAIN - ADULT  Goal: Verbalizes/displays adequate comfort level or patient's stated pain goal  Description: INTERVENTIONS:  - Encourage pt to monitor pain and request assistance  - Assess pain using appropriate pain scale  - Administer analgesics based on type and severity of pain and evaluate response  - Implement non-pharmacological measures as appropriate and evaluate response  - Consider cultural and social influences on pain and pain management  - Manage/alleviate anxiety  - Utilize distraction and/or relaxation techniques  - Monitor for opioid side effects  - Notify MD/LIP if interventions unsuccessful or patient reports new pain  - Anticipate increased pain with activity and pre-medicate as appropriate  Outcome: Progressing     Problem: SAFETY ADULT - FALL  Goal: Free from fall injury  Description: INTERVENTIONS:  - Assess pt frequently for physical needs  - Identify cognitive and physical deficits and behaviors that affect risk of falls.  - [place] fall precautions as indicated by assessment.  - Educate pt/family on patient safety including physical limitations  - Instruct pt to call for assistance with activity based on assessment  - Modify environment to reduce risk of injury  - Provide assistive devices as appropriate  - Consider OT/PT consult to assist with strengthening/mobility  - Encourage toileting schedule  Outcome: Progressing     Problem: GASTROINTESTINAL - ADULT  Goal: Minimal or absence of nausea and vomiting  Description: INTERVENTIONS:  - Maintain adequate hydration with IV or PO as ordered and tolerated  - Nasogastric tube to low intermittent suction as ordered  - Evaluate effectiveness of ordered antiemetic medications  - Provide nonpharmacologic comfort measures as appropriate  - Advance diet as tolerated, if ordered  - Obtain nutritional consult as needed  - Evaluate fluid balance  Outcome: Progressing  Goal: Maintains or returns to baseline bowel function  Description: INTERVENTIONS:  - Assess bowel function  - Maintain adequate hydration with IV or PO as ordered and tolerated  - Evaluate effectiveness of GI medications  - Encourage mobilization and activity  - Obtain nutritional consult as needed  - Establish a toileting routine/schedule  - Consider collaborating with pharmacy to review patient's medication profile  Outcome: Progressing  Goal: Maintains adequate nutritional intake (undernourished)  Description: INTERVENTIONS:  - Monitor percentage of each meal consumed  - Identify factors contributing to decreased intake, treat as appropriate  - Assist with meals as needed  - Monitor I&O, WT and lab values  - Obtain nutritional consult as needed  - Optimize oral hygiene and moisture  - Encourage food from home; allow for food preferences  - Enhance eating environment  Outcome: Progressing     Problem: METABOLIC/FLUID AND ELECTROLYTES - ADULT  Goal: Electrolytes maintained within normal limits  Description: INTERVENTIONS:  - Monitor labs and rhythm and assess patient for signs and symptoms of electrolyte imbalances  - Administer electrolyte replacement as ordered  - Monitor response to electrolyte replacements, including rhythm and repeat lab results as appropriate  - Fluid restriction as ordered  - Instruct patient on fluid and nutrition restrictions as appropriate  Outcome: Progressing     Problem: SKIN/TISSUE INTEGRITY - ADULT  Goal: Skin integrity remains intact  Description: INTERVENTIONS  - Assess and document risk factors for pressure ulcer development  - Assess and document skin integrity  - Monitor for areas of redness and/or skin breakdown  - Initiate interventions, skin care algorithm/standards of care as needed  Outcome: Progressing

## 2023-01-08 NOTE — CM/SW NOTE
Received MDO for insurance issues. Spoke w/ patient, he has applied for Medicaid but does not qualify as he is over income. He has looked into private insurance but monthly cost is too expensive. Patient lives in [place] and has access to [employer] [employer] system. Explained patient will need to establish a PCP through the [employer] [employer] system and they would refer him to their GI clinic. [facility] can provide sliding scale & low cost f/u care. Info for [facility] added to patient's AVS. Patient expressed understanding, no other discharge needs identified.     Bo Levo, [address]

## 2023-01-08 NOTE — DISCHARGE INSTRUCTIONS
Please contact [facility] to establish PCP  [facility]  [address] [facility], [address]

## 2023-01-08 NOTE — PLAN OF CARE
VSS. No complaints of pain. IV zosyn dose for am administered. Problem: Patient Centered Care  Goal: Patient preferences are identified and integrated in the patient's plan of care  Description: Interventions:  - What would you like us to know as we care for you? I have trouble getting funds together.  - Provide timely, complete, and accurate information to patient/family  - Incorporate patient and family knowledge, values, beliefs, and cultural backgrounds into the planning and delivery of care  - Encourage patient/family to participate in care and decision-making at the level they choose  - Honor patient and family perspectives and choices  Outcome: Completed     Problem: PAIN - ADULT  Goal: Verbalizes/displays adequate comfort level or patient's stated pain goal  Description: INTERVENTIONS:  - Encourage pt to monitor pain and request assistance  - Assess pain using appropriate pain scale  - Administer analgesics based on type and severity of pain and evaluate response  - Implement non-pharmacological measures as appropriate and evaluate response  - Consider cultural and social influences on pain and pain management  - Manage/alleviate anxiety  - Utilize distraction and/or relaxation techniques  - Monitor for opioid side effects  - Notify MD/LIP if interventions unsuccessful or patient reports new pain  - Anticipate increased pain with activity and pre-medicate as appropriate  Outcome: Completed     Problem: SAFETY ADULT - FALL  Goal: Free from fall injury  Description: INTERVENTIONS:  - Assess pt frequently for physical needs  - Identify cognitive and physical deficits and behaviors that affect risk of falls.   - [place] fall precautions as indicated by assessment.  - Educate pt/family on patient safety including physical limitations  - Instruct pt to call for assistance with activity based on assessment  - Modify environment to reduce risk of injury  - Provide assistive devices as appropriate  - Consider OT/PT consult to assist with strengthening/mobility  - Encourage toileting schedule  Outcome: Completed     Problem: GASTROINTESTINAL - ADULT  Goal: Minimal or absence of nausea and vomiting  Description: INTERVENTIONS:  - Maintain adequate hydration with IV or PO as ordered and tolerated  - Nasogastric tube to low intermittent suction as ordered  - Evaluate effectiveness of ordered antiemetic medications  - Provide nonpharmacologic comfort measures as appropriate  - Advance diet as tolerated, if ordered  - Obtain nutritional consult as needed  - Evaluate fluid balance  Outcome: Completed  Goal: Maintains or returns to baseline bowel function  Description: INTERVENTIONS:  - Assess bowel function  - Maintain adequate hydration with IV or PO as ordered and tolerated  - Evaluate effectiveness of GI medications  - Encourage mobilization and activity  - Obtain nutritional consult as needed  - Establish a toileting routine/schedule  - Consider collaborating with pharmacy to review patient's medication profile  Outcome: Completed  Goal: Maintains adequate nutritional intake (undernourished)  Description: INTERVENTIONS:  - Monitor percentage of each meal consumed  - Identify factors contributing to decreased intake, treat as appropriate  - Assist with meals as needed  - Monitor I&O, WT and lab values  - Obtain nutritional consult as needed  - Optimize oral hygiene and moisture  - Encourage food from home; allow for food preferences  - Enhance eating environment  Outcome: Completed     Problem: METABOLIC/FLUID AND ELECTROLYTES - ADULT  Goal: Electrolytes maintained within normal limits  Description: INTERVENTIONS:  - Monitor labs and rhythm and assess patient for signs and symptoms of electrolyte imbalances  - Administer electrolyte replacement as ordered  - Monitor response to electrolyte replacements, including rhythm and repeat lab results as appropriate  - Fluid restriction as ordered  - Instruct patient on fluid and nutrition restrictions as appropriate  Outcome: Completed     Problem: SKIN/TISSUE INTEGRITY - ADULT  Goal: Skin integrity remains intact  Description: INTERVENTIONS  - Assess and document risk factors for pressure ulcer development  - Assess and document skin integrity  - Monitor for areas of redness and/or skin breakdown  - Initiate interventions, skin care algorithm/standards of care as needed  Outcome: Completed

## 2023-01-09 ENCOUNTER — PATIENT OUTREACH (OUTPATIENT)
Dept: CASE MANAGEMENT | Age: 28
End: 2023-01-09

## 2023-01-09 LAB
ALBUMIN SERPL ELPH-MCNC: 3.5 G/DL (ref 3.75–5.21)
ALBUMIN/GLOB SERPL: 0.83 {RATIO} (ref 1–2)
ALPHA1 GLOB SERPL ELPH-MCNC: 0.31 G/DL (ref 0.19–0.46)
ALPHA2 GLOB SERPL ELPH-MCNC: 1.02 G/DL (ref 0.48–1.05)
B-GLOBULIN SERPL ELPH-MCNC: 1.02 G/DL (ref 0.68–1.23)
GAMMA GLOB SERPL ELPH-MCNC: 1.84 G/DL (ref 0.62–1.7)
KAPPA LC FREE SER-MCNC: 3.93 MG/DL (ref 0.33–1.94)
KAPPA LC FREE/LAMBDA FREE SER NEPH: 1.51 {RATIO} (ref 0.26–1.65)
LAMBDA LC FREE SERPL-MCNC: 2.61 MG/DL (ref 0.57–2.63)
PROT SERPL-MCNC: 7.7 G/DL (ref 6.4–8.2)

## 2023-01-09 NOTE — PROGRESS NOTES
TCM chart review. No TCM as patient follows with outside [facility] PCP. Encounter closing.

## 2023-04-28 ENCOUNTER — APPOINTMENT (OUTPATIENT)
Dept: CT IMAGING | Facility: HOSPITAL | Age: 28
End: 2023-04-28
Attending: EMERGENCY MEDICINE
Payer: MEDICAID

## 2023-04-28 ENCOUNTER — HOSPITAL ENCOUNTER (INPATIENT)
Facility: HOSPITAL | Age: 28
LOS: 2 days | Discharge: HOME | End: 2023-04-30
Attending: EMERGENCY MEDICINE | Admitting: HOSPITALIST
Payer: MEDICAID

## 2023-04-28 DIAGNOSIS — K57.92 ACUTE DIVERTICULITIS: Primary | ICD-10-CM

## 2023-04-28 LAB
ALBUMIN SERPL-MCNC: 4.2 G/DL (ref 3.4–5)
ALBUMIN/GLOB SERPL: 0.8 {RATIO} (ref 1–2)
ALP LIVER SERPL-CCNC: 93 U/L
ALT SERPL-CCNC: 26 U/L
ANION GAP SERPL CALC-SCNC: 11 MMOL/L (ref 0–18)
AST SERPL-CCNC: 19 U/L (ref 15–37)
BASOPHILS # BLD AUTO: 0.09 X10(3) UL (ref 0–0.2)
BASOPHILS NFR BLD AUTO: 0.7 %
BILIRUB SERPL-MCNC: 0.5 MG/DL (ref 0.1–2)
BILIRUB UR QL: NEGATIVE
BUN BLD-MCNC: 9 MG/DL (ref 7–18)
BUN/CREAT SERPL: 11 (ref 10–20)
CALCIUM BLD-MCNC: 9.7 MG/DL (ref 8.5–10.1)
CHLORIDE SERPL-SCNC: 108 MMOL/L (ref 98–112)
CLARITY UR: CLEAR
CO2 SERPL-SCNC: 21 MMOL/L (ref 21–32)
CREAT BLD-MCNC: 0.82 MG/DL
DEPRECATED RDW RBC AUTO: 41.7 FL (ref 35.1–46.3)
EOSINOPHIL # BLD AUTO: 0.23 X10(3) UL (ref 0–0.7)
EOSINOPHIL NFR BLD AUTO: 1.7 %
ERYTHROCYTE [DISTWIDTH] IN BLOOD BY AUTOMATED COUNT: 13.6 % (ref 11–15)
GFR SERPLBLD BASED ON 1.73 SQ M-ARVRAT: 122 ML/MIN/1.73M2 (ref 60–?)
GLOBULIN PLAS-MCNC: 5.2 G/DL (ref 2.8–4.4)
GLUCOSE BLD-MCNC: 92 MG/DL (ref 70–99)
GLUCOSE UR-MCNC: NORMAL MG/DL
HCT VFR BLD AUTO: 47.1 %
HGB BLD-MCNC: 15.9 G/DL
HGB UR QL STRIP.AUTO: NEGATIVE
IMM GRANULOCYTES # BLD AUTO: 0.03 X10(3) UL (ref 0–1)
IMM GRANULOCYTES NFR BLD: 0.2 %
KETONES UR-MCNC: NEGATIVE MG/DL
LEUKOCYTE ESTERASE UR QL STRIP.AUTO: NEGATIVE
LYMPHOCYTES # BLD AUTO: 2.02 X10(3) UL (ref 1–4)
LYMPHOCYTES NFR BLD AUTO: 15.3 %
MCH RBC QN AUTO: 28.3 PG (ref 26–34)
MCHC RBC AUTO-ENTMCNC: 33.8 G/DL (ref 31–37)
MCV RBC AUTO: 84 FL
MONOCYTES # BLD AUTO: 0.55 X10(3) UL (ref 0.1–1)
MONOCYTES NFR BLD AUTO: 4.2 %
NEUTROPHILS # BLD AUTO: 10.27 X10 (3) UL (ref 1.5–7.7)
NEUTROPHILS # BLD AUTO: 10.27 X10(3) UL (ref 1.5–7.7)
NEUTROPHILS NFR BLD AUTO: 77.9 %
NITRITE UR QL STRIP.AUTO: NEGATIVE
OSMOLALITY SERPL CALC.SUM OF ELEC: 288 MOSM/KG (ref 275–295)
PH UR: 6 [PH] (ref 5–8)
PLATELET # BLD AUTO: 320 10(3)UL (ref 150–450)
POTASSIUM SERPL-SCNC: 3.5 MMOL/L (ref 3.5–5.1)
PROT SERPL-MCNC: 9.4 G/DL (ref 6.4–8.2)
RBC # BLD AUTO: 5.61 X10(6)UL
SODIUM SERPL-SCNC: 140 MMOL/L (ref 136–145)
SP GR UR STRIP: 1.01 (ref 1–1.03)
UROBILINOGEN UR STRIP-ACNC: NORMAL
WBC # BLD AUTO: 13.2 X10(3) UL (ref 4–11)

## 2023-04-28 PROCEDURE — 74177 CT ABD & PELVIS W/CONTRAST: CPT | Performed by: EMERGENCY MEDICINE

## 2023-04-28 PROCEDURE — 99254 IP/OBS CNSLTJ NEW/EST MOD 60: CPT | Performed by: INTERNAL MEDICINE

## 2023-04-28 RX ORDER — PROCHLORPERAZINE EDISYLATE 5 MG/ML
5 INJECTION INTRAMUSCULAR; INTRAVENOUS EVERY 8 HOURS PRN
Status: DISCONTINUED | OUTPATIENT
Start: 2023-04-28 | End: 2023-04-30

## 2023-04-28 RX ORDER — SODIUM CHLORIDE 9 MG/ML
INJECTION, SOLUTION INTRAVENOUS CONTINUOUS
Status: DISCONTINUED | OUTPATIENT
Start: 2023-04-28 | End: 2023-04-30

## 2023-04-28 RX ORDER — ACETAMINOPHEN 500 MG
500 TABLET ORAL EVERY 4 HOURS PRN
Status: DISCONTINUED | OUTPATIENT
Start: 2023-04-28 | End: 2023-04-30

## 2023-04-28 RX ORDER — MORPHINE SULFATE 4 MG/ML
4 INJECTION, SOLUTION INTRAMUSCULAR; INTRAVENOUS EVERY 2 HOUR PRN
Status: DISCONTINUED | OUTPATIENT
Start: 2023-04-28 | End: 2023-04-30

## 2023-04-28 RX ORDER — MORPHINE SULFATE 2 MG/ML
1 INJECTION, SOLUTION INTRAMUSCULAR; INTRAVENOUS EVERY 2 HOUR PRN
Status: DISCONTINUED | OUTPATIENT
Start: 2023-04-28 | End: 2023-04-30

## 2023-04-28 RX ORDER — HEPARIN SODIUM 5000 [USP'U]/ML
5000 INJECTION, SOLUTION INTRAVENOUS; SUBCUTANEOUS EVERY 8 HOURS SCHEDULED
Status: DISCONTINUED | OUTPATIENT
Start: 2023-04-28 | End: 2023-04-30

## 2023-04-28 RX ORDER — MORPHINE SULFATE 2 MG/ML
2 INJECTION, SOLUTION INTRAMUSCULAR; INTRAVENOUS EVERY 2 HOUR PRN
Status: DISCONTINUED | OUTPATIENT
Start: 2023-04-28 | End: 2023-04-30

## 2023-04-28 RX ORDER — MELATONIN
3 NIGHTLY PRN
Status: DISCONTINUED | OUTPATIENT
Start: 2023-04-28 | End: 2023-04-30

## 2023-04-28 RX ORDER — METRONIDAZOLE 500 MG/100ML
500 INJECTION, SOLUTION INTRAVENOUS ONCE
Status: COMPLETED | OUTPATIENT
Start: 2023-04-28 | End: 2023-04-29

## 2023-04-28 RX ORDER — METRONIDAZOLE 500 MG/100ML
500 INJECTION, SOLUTION INTRAVENOUS EVERY 8 HOURS
Status: DISCONTINUED | OUTPATIENT
Start: 2023-04-29 | End: 2023-04-30

## 2023-04-28 RX ORDER — ONDANSETRON 2 MG/ML
4 INJECTION INTRAMUSCULAR; INTRAVENOUS EVERY 4 HOURS PRN
Status: DISCONTINUED | OUTPATIENT
Start: 2023-04-28 | End: 2023-04-28

## 2023-04-28 RX ORDER — ONDANSETRON 2 MG/ML
4 INJECTION INTRAMUSCULAR; INTRAVENOUS EVERY 6 HOURS PRN
Status: DISCONTINUED | OUTPATIENT
Start: 2023-04-28 | End: 2023-04-30

## 2023-04-29 LAB
ANION GAP SERPL CALC-SCNC: 5 MMOL/L (ref 0–18)
BASOPHILS # BLD AUTO: 0.09 X10(3) UL (ref 0–0.2)
BASOPHILS NFR BLD AUTO: 0.8 %
BUN BLD-MCNC: 8 MG/DL (ref 7–18)
BUN/CREAT SERPL: 10.7 (ref 10–20)
CALCIUM BLD-MCNC: 9.3 MG/DL (ref 8.5–10.1)
CHLORIDE SERPL-SCNC: 108 MMOL/L (ref 98–112)
CO2 SERPL-SCNC: 26 MMOL/L (ref 21–32)
CREAT BLD-MCNC: 0.75 MG/DL
DEPRECATED RDW RBC AUTO: 44 FL (ref 35.1–46.3)
EOSINOPHIL # BLD AUTO: 0.33 X10(3) UL (ref 0–0.7)
EOSINOPHIL NFR BLD AUTO: 2.9 %
ERYTHROCYTE [DISTWIDTH] IN BLOOD BY AUTOMATED COUNT: 14 % (ref 11–15)
GFR SERPLBLD BASED ON 1.73 SQ M-ARVRAT: 125 ML/MIN/1.73M2 (ref 60–?)
GLUCOSE BLD-MCNC: 90 MG/DL (ref 70–99)
HCT VFR BLD AUTO: 43.9 %
HGB BLD-MCNC: 14.4 G/DL
IMM GRANULOCYTES # BLD AUTO: 0.02 X10(3) UL (ref 0–1)
IMM GRANULOCYTES NFR BLD: 0.2 %
LYMPHOCYTES # BLD AUTO: 2.62 X10(3) UL (ref 1–4)
LYMPHOCYTES NFR BLD AUTO: 23.1 %
MAGNESIUM SERPL-MCNC: 2 MG/DL (ref 1.6–2.6)
MCH RBC QN AUTO: 28.6 PG (ref 26–34)
MCHC RBC AUTO-ENTMCNC: 32.8 G/DL (ref 31–37)
MCV RBC AUTO: 87.1 FL
MONOCYTES # BLD AUTO: 0.76 X10(3) UL (ref 0.1–1)
MONOCYTES NFR BLD AUTO: 6.7 %
NEUTROPHILS # BLD AUTO: 7.5 X10 (3) UL (ref 1.5–7.7)
NEUTROPHILS # BLD AUTO: 7.5 X10(3) UL (ref 1.5–7.7)
NEUTROPHILS NFR BLD AUTO: 66.3 %
OSMOLALITY SERPL CALC.SUM OF ELEC: 286 MOSM/KG (ref 275–295)
PLATELET # BLD AUTO: 269 10(3)UL (ref 150–450)
POTASSIUM SERPL-SCNC: 3.6 MMOL/L (ref 3.5–5.1)
RBC # BLD AUTO: 5.04 X10(6)UL
SODIUM SERPL-SCNC: 139 MMOL/L (ref 136–145)
WBC # BLD AUTO: 11.3 X10(3) UL (ref 4–11)

## 2023-04-29 PROCEDURE — 99232 SBSQ HOSP IP/OBS MODERATE 35: CPT | Performed by: HOSPITALIST

## 2023-04-29 PROCEDURE — 99222 1ST HOSP IP/OBS MODERATE 55: CPT | Performed by: SURGERY

## 2023-04-29 NOTE — CONSULTS
[facility]    PATIENT'S NAME: Thelma Lay PHYSICIAN: Wesley Ge MD   CONSULTING PHYSICIAN: Ceci Neely MD   PATIENT ACCOUNT#:   [id number]    LOCATION:  [address] [place] [address] Kevin Waters #:   [id number]       YOB: 1995  ADMISSION DATE:       04/28/2023      CONSULT DATE:  04/29/2023    REPORT OF CONSULTATION        REASON FOR CONSULTATION:  Sigmoid diverticulitis. HISTORY OF PRESENT ILLNESS:  The patient is a 23-year-old male with a history of sigmoid diverticulitis. He presented to [facility] in January 2023, was treated, seen by Dr. Jeaneth Box from General Surgery. The patient then was seen at [facility], March 8 with hydronephrosis, hydroureter secondary to his severe sigmoid diverticulitis. There is a question of a colovesical and colo-colonic fistula. I do not believe he has had a colonoscopy yet to rule out inflammatory bowel disease. He was seen by Dr. Snow Joe from Colorectal Surgery at [facility]. He comes in now with pain, bloody stools, and nausea for three days. PAST MEDICAL HISTORY:  Pneumonia, diverticulitis. PAST SURGICAL HISTORY:  None. MEDICATIONS:  Hydrocodone. ALLERGIES:  None. SOCIAL HISTORY:  Does not drink, smoke, or take illicits. FAMILY HISTORY:  Noncontributory. REVIEW OF SYSTEMS:  Significant for abdominal pain, nausea. PHYSICAL EXAMINATION:    GENERAL:  He is alert and oriented x3. Appears comfortable. No distress. He is afebrile at 98.3. VITAL SIGNS:  Stable. HEENT:  EOMI, PERRLA. LUNGS:  Clear to auscultation. No wheezing or rhonchi. HEART:  Regular rate and rhythm, S1, S2. No murmur, rubs, or gallops. ABDOMEN:  Soft, nontender, nondistended. No rebound, guarding, or rigidity. NEUROLOGIC:  Nonfocal.  EXTREMITIES:  Without clubbing, cyanosis, or edema. LABORATORY DATA:  White count 13.2.   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 IMPRESSION:  Acute on chronic sigmoid diverticulitis in a patient being follow by [facility]. No planned surgical intervention. No need for drainage at this point. He should be treated with antibiotics and followup with his colorectal surgeon.     Dictated By Danilo Armstrong MD  d: 04/29/2023 10:07:34  t: 04/29/2023 10:38:06  [facility] [id number]  CN/

## 2023-04-29 NOTE — PLAN OF CARE
Patient alert and oriented. IVF running. IV abx. Curent NPO diet. Call light within reach. PRN pain meds per orders. Diet changed to low fiber/soft diet. Patient states diarrhea and pain after eating, will change to CLD per MD.      Problem: Patient Centered Care  Goal: Patient preferences are identified and integrated in the patient's plan of care  Description: Interventions:  - What would you like us to know as we care for you?  From home with gf  - Provide timely, complete, and accurate information to patient/family  - Incorporate patient and family knowledge, values, beliefs, and cultural backgrounds into the planning and delivery of care  - Encourage patient/family to participate in care and decision-making at the level they choose  - Honor patient and family perspectives and choices  Outcome: Progressing     Problem: Patient/Family Goals  Goal: Patient/Family Long Term Goal  Description: Patient's Long Term Goal: go home    Interventions:  - monitor labs  -discharge planning  - See additional Care Plan goals for specific interventions  Outcome: Progressing  Goal: Patient/Family Short Term Goal  Description: Patient's Short Term Goal: pain relief    Interventions:   - pain meds per orders  -monitor and assess pain levels  - See additional Care Plan goals for specific interventions  Outcome: Progressing     Problem: GASTROINTESTINAL - ADULT  Goal: Minimal or absence of nausea and vomiting  Description: INTERVENTIONS:  - Maintain adequate hydration with IV or PO as ordered and tolerated  - Nasogastric tube to low intermittent suction as ordered  - Evaluate effectiveness of ordered antiemetic medications  - Provide nonpharmacologic comfort measures as appropriate  - Advance diet as tolerated, if ordered  - Obtain nutritional consult as needed  - Evaluate fluid balance  Outcome: Progressing  Goal: Maintains or returns to baseline bowel function  Description: INTERVENTIONS:  - Assess bowel function  - Maintain adequate hydration with IV or PO as ordered and tolerated  - Evaluate effectiveness of GI medications  - Encourage mobilization and activity  - Obtain nutritional consult as needed  - Establish a toileting routine/schedule  - Consider collaborating with pharmacy to review patient's medication profile  Outcome: Progressing  Goal: Maintains adequate nutritional intake (undernourished)  Description: INTERVENTIONS:  - Monitor percentage of each meal consumed  - Identify factors contributing to decreased intake, treat as appropriate  - Assist with meals as needed  - Monitor I&O, WT and lab values  - Obtain nutritional consult as needed  - Optimize oral hygiene and moisture  - Encourage food from home; allow for food preferences  - Enhance eating environment  Outcome: Progressing  Goal: Achieves appropriate nutritional intake (bariatric)  Description: INTERVENTIONS:  - Monitor for over-consumption  - Identify factors contributing to increased intake, treat as appropriate  - Monitor I&O, WT and lab values  - Obtain nutritional consult as needed  - Evaluate psychosocial factors contributing to over-consumption  Outcome: Progressing

## 2023-04-29 NOTE — PROGRESS NOTES
General surgery note    Full dictated consult to follow    Complicated sigmoid diverticulitis  -Patient seen and being evaluated by colorectal surgery at [facility].  -No acute need for drainage or surgery.  -Plan IV antibiotics and follow-up with his surgeon at [facility]

## 2023-04-29 NOTE — PLAN OF CARE
Received pt from ER, AO x4. VSS on RA  +tenderness to LUQ/LLQ, bowel sounds active x4  Denies nausea/vomiting. Remains strict NPO  IVF and IV abx initiated  Morphine given for abdominal pain-see MAR    Problem: Patient Centered Care  Goal: Patient preferences are identified and integrated in the patient's plan of care  Description: Interventions:  - What would you like us to know as we care for you?  Hx of diverticulitis  - Provide timely, complete, and accurate information to patient/family  - Incorporate patient and family knowledge, values, beliefs, and cultural backgrounds into the planning and delivery of care  - Encourage patient/family to participate in care and decision-making at the level they choose  - Honor patient and family perspectives and choices  Outcome: Progressing     Problem: Patient/Family Goals  Goal: Patient/Family Long Term Goal  Description: Patient's Long Term Goal: Discharge home    Interventions:  - Continue IV abx therapy  - See additional Care Plan goals for specific interventions  Outcome: Progressing  Goal: Patient/Family Short Term Goal  Description: Patient's Short Term Goal: Pain control    Interventions:   - Medicate as needed  - See additional Care Plan goals for specific interventions  Outcome: Progressing     Problem: GASTROINTESTINAL - ADULT  Goal: Minimal or absence of nausea and vomiting  Description: INTERVENTIONS:  - Maintain adequate hydration with IV or PO as ordered and tolerated  - Nasogastric tube to low intermittent suction as ordered  - Evaluate effectiveness of ordered antiemetic medications  - Provide nonpharmacologic comfort measures as appropriate  - Advance diet as tolerated, if ordered  - Obtain nutritional consult as needed  - Evaluate fluid balance  Outcome: Progressing  Goal: Maintains or returns to baseline bowel function  Description: INTERVENTIONS:  - Assess bowel function  - Maintain adequate hydration with IV or PO as ordered and tolerated  - Evaluate effectiveness of GI medications  - Encourage mobilization and activity  - Obtain nutritional consult as needed  - Establish a toileting routine/schedule  - Consider collaborating with pharmacy to review patient's medication profile  Outcome: Progressing  Goal: Maintains adequate nutritional intake (undernourished)  Description: INTERVENTIONS:  - Monitor percentage of each meal consumed  - Identify factors contributing to decreased intake, treat as appropriate  - Assist with meals as needed  - Monitor I&O, WT and lab values  - Obtain nutritional consult as needed  - Optimize oral hygiene and moisture  - Encourage food from home; allow for food preferences  - Enhance eating environment  Outcome: Progressing  Goal: Achieves appropriate nutritional intake (bariatric)  Description: INTERVENTIONS:  - Monitor for over-consumption  - Identify factors contributing to increased intake, treat as appropriate  - Monitor I&O, WT and lab values  - Obtain nutritional consult as needed  - Evaluate psychosocial factors contributing to over-consumption  Outcome: Progressing

## 2023-04-29 NOTE — ED QUICK NOTES
History multiple episodes diverticulitis, today with LLQ pain radiating around L flank. Denies vomiting or fevers.   Reports one soft bowel movement today with blood

## 2023-04-29 NOTE — ED INITIAL ASSESSMENT (HPI)
Pt c/o left abd for the past 3x days; hx of diverticulitis and feels like it is a flare up. Last admission for diverticulitis was beginning of March. Bloody stools. Denies n/v/d. Denies fevers.

## 2023-04-30 VITALS
DIASTOLIC BLOOD PRESSURE: 83 MMHG | BODY MASS INDEX: 35.66 KG/M2 | SYSTOLIC BLOOD PRESSURE: 133 MMHG | OXYGEN SATURATION: 99 % | RESPIRATION RATE: 16 BRPM | WEIGHT: 249.13 LBS | HEIGHT: 70 IN | TEMPERATURE: 98 F | HEART RATE: 62 BPM

## 2023-04-30 PROCEDURE — 99239 HOSP IP/OBS DSCHRG MGMT >30: CPT | Performed by: HOSPITALIST

## 2023-04-30 PROCEDURE — 99233 SBSQ HOSP IP/OBS HIGH 50: CPT | Performed by: SURGERY

## 2023-04-30 RX ORDER — CEFDINIR 300 MG/1
300 CAPSULE ORAL 2 TIMES DAILY
Qty: 14 CAPSULE | Refills: 0 | Status: SHIPPED | OUTPATIENT
Start: 2023-04-30

## 2023-04-30 RX ORDER — HYDROCODONE BITARTRATE AND ACETAMINOPHEN 5; 325 MG/1; MG/1
1-2 TABLET ORAL EVERY 4 HOURS PRN
Qty: 20 TABLET | Refills: 0 | Status: SHIPPED | OUTPATIENT
Start: 2023-04-30

## 2023-04-30 RX ORDER — ONDANSETRON 4 MG/1
4 TABLET, FILM COATED ORAL EVERY 8 HOURS PRN
Qty: 20 TABLET | Refills: 0 | Status: SHIPPED | OUTPATIENT
Start: 2023-04-30

## 2023-04-30 NOTE — PLAN OF CARE
Patient alert and oriented. Clear liquid diet. IVF running. No acute changes. Call light within reach. Pt tolerated full liquid diet. Discharge order in. Patient plan to DC home with family. Prescriptions sent to pharmacy. IV out, AVS reviewed with patient and family. No questions or concerns at this time. Problem: Patient Centered Care  Goal: Patient preferences are identified and integrated in the patient's plan of care  Description: Interventions:  - What would you like us to know as we care for you?  From home with gf   - Provide timely, complete, and accurate information to patient/family  - Incorporate patient and family knowledge, values, beliefs, and cultural backgrounds into the planning and delivery of care  - Encourage patient/family to participate in care and decision-making at the level they choose  - Honor patient and family perspectives and choices  Outcome: Progressing     Problem: Patient/Family Goals  Goal: Patient/Family Long Term Goal  Description: Patient's Long Term Goal: go home    Interventions:  - discharge planning  -monitor labs  -advance diet  - See additional Care Plan goals for specific interventions  Outcome: Progressing  Goal: Patient/Family Short Term Goal  Description: Patient's Short Term Goal: pain relief    Interventions:   - monitor and assess pain levels  -pain meds per orders  - See additional Care Plan goals for specific interventions  Outcome: Progressing     Problem: GASTROINTESTINAL - ADULT  Goal: Minimal or absence of nausea and vomiting  Description: INTERVENTIONS:  - Maintain adequate hydration with IV or PO as ordered and tolerated  - Nasogastric tube to low intermittent suction as ordered  - Evaluate effectiveness of ordered antiemetic medications  - Provide nonpharmacologic comfort measures as appropriate  - Advance diet as tolerated, if ordered  - Obtain nutritional consult as needed  - Evaluate fluid balance  Outcome: Progressing  Goal: Maintains or returns to baseline bowel function  Description: INTERVENTIONS:  - Assess bowel function  - Maintain adequate hydration with IV or PO as ordered and tolerated  - Evaluate effectiveness of GI medications  - Encourage mobilization and activity  - Obtain nutritional consult as needed  - Establish a toileting routine/schedule  - Consider collaborating with pharmacy to review patient's medication profile  Outcome: Progressing  Goal: Maintains adequate nutritional intake (undernourished)  Description: INTERVENTIONS:  - Monitor percentage of each meal consumed  - Identify factors contributing to decreased intake, treat as appropriate  - Assist with meals as needed  - Monitor I&O, WT and lab values  - Obtain nutritional consult as needed  - Optimize oral hygiene and moisture  - Encourage food from home; allow for food preferences  - Enhance eating environment  Outcome: Progressing  Goal: Achieves appropriate nutritional intake (bariatric)  Description: INTERVENTIONS:  - Monitor for over-consumption  - Identify factors contributing to increased intake, treat as appropriate  - Monitor I&O, WT and lab values  - Obtain nutritional consult as needed  - Evaluate psychosocial factors contributing to over-consumption  Outcome: Progressing

## 2023-05-01 ENCOUNTER — PATIENT OUTREACH (OUTPATIENT)
Dept: CASE MANAGEMENT | Age: 28
End: 2023-05-01

## 2023-05-01 NOTE — PROGRESS NOTES
TCM chart review. No TCM as patient follows with outside [facility] PCP. Encounter closing.

## 2023-07-19 ENCOUNTER — ANESTHESIA EVENT (OUTPATIENT)
Dept: SURGERY | Facility: HOSPITAL | Age: 28
End: 2023-07-19
Payer: MEDICAID

## 2023-07-19 ENCOUNTER — APPOINTMENT (OUTPATIENT)
Dept: GENERAL RADIOLOGY | Facility: HOSPITAL | Age: 28
DRG: 661 | End: 2023-07-19
Attending: UROLOGY
Payer: MEDICAID

## 2023-07-19 ENCOUNTER — ANESTHESIA (OUTPATIENT)
Dept: SURGERY | Facility: HOSPITAL | Age: 28
End: 2023-07-19
Payer: MEDICAID

## 2023-07-19 ENCOUNTER — APPOINTMENT (OUTPATIENT)
Dept: GENERAL RADIOLOGY | Facility: HOSPITAL | Age: 28
End: 2023-07-19
Attending: UROLOGY
Payer: MEDICAID

## 2023-07-19 ENCOUNTER — HOSPITAL ENCOUNTER (INPATIENT)
Facility: HOSPITAL | Age: 28
LOS: 2 days | Discharge: HOME | End: 2023-07-21
Attending: EMERGENCY MEDICINE
Payer: MEDICAID

## 2023-07-19 ENCOUNTER — APPOINTMENT (OUTPATIENT)
Dept: CT IMAGING | Facility: HOSPITAL | Age: 28
End: 2023-07-19
Attending: EMERGENCY MEDICINE
Payer: MEDICAID

## 2023-07-19 ENCOUNTER — APPOINTMENT (OUTPATIENT)
Dept: CT IMAGING | Facility: HOSPITAL | Age: 28
DRG: 661 | End: 2023-07-19
Attending: EMERGENCY MEDICINE
Payer: MEDICAID

## 2023-07-19 ENCOUNTER — HOSPITAL ENCOUNTER (INPATIENT)
Facility: HOSPITAL | Age: 28
LOS: 2 days | Discharge: HOME | DRG: 661 | End: 2023-07-21
Attending: EMERGENCY MEDICINE | Admitting: HOSPITALIST
Payer: MEDICAID

## 2023-07-19 DIAGNOSIS — N12 PYELONEPHRITIS: Primary | ICD-10-CM

## 2023-07-19 LAB
ALBUMIN SERPL-MCNC: 3.5 G/DL (ref 3.4–5)
ALP LIVER SERPL-CCNC: 86 U/L
ALT SERPL-CCNC: 20 U/L
ANION GAP SERPL CALC-SCNC: 8 MMOL/L (ref 0–18)
AST SERPL-CCNC: 15 U/L (ref 15–37)
BASOPHILS # BLD AUTO: 0.04 X10(3) UL (ref 0–0.2)
BASOPHILS NFR BLD AUTO: 0.4 %
BILIRUB DIRECT SERPL-MCNC: 0.2 MG/DL (ref 0–0.2)
BILIRUB SERPL-MCNC: 1.1 MG/DL (ref 0.1–2)
BILIRUB UR QL: NEGATIVE
BUN BLD-MCNC: 7 MG/DL (ref 7–18)
BUN/CREAT SERPL: 7.1 (ref 10–20)
CALCIUM BLD-MCNC: 8.9 MG/DL (ref 8.5–10.1)
CHLORIDE SERPL-SCNC: 108 MMOL/L (ref 98–112)
CO2 SERPL-SCNC: 23 MMOL/L (ref 21–32)
COLOR UR: YELLOW
CREAT BLD-MCNC: 0.99 MG/DL
DEPRECATED RDW RBC AUTO: 39.6 FL (ref 35.1–46.3)
EOSINOPHIL # BLD AUTO: 0.13 X10(3) UL (ref 0–0.7)
EOSINOPHIL NFR BLD AUTO: 1.3 %
ERYTHROCYTE [DISTWIDTH] IN BLOOD BY AUTOMATED COUNT: 12.8 % (ref 11–15)
FLUAV + FLUBV RNA SPEC NAA+PROBE: NEGATIVE
FLUAV + FLUBV RNA SPEC NAA+PROBE: NEGATIVE
GFR SERPLBLD BASED ON 1.73 SQ M-ARVRAT: 106 ML/MIN/1.73M2 (ref 60–?)
GLUCOSE BLD-MCNC: 109 MG/DL (ref 70–99)
GLUCOSE UR-MCNC: NORMAL MG/DL
HCT VFR BLD AUTO: 40 %
HGB BLD-MCNC: 13.4 G/DL
IMM GRANULOCYTES # BLD AUTO: 0.04 X10(3) UL (ref 0–1)
IMM GRANULOCYTES NFR BLD: 0.4 %
KETONES UR-MCNC: NEGATIVE MG/DL
LACTATE SERPL-SCNC: 1.3 MMOL/L (ref 0.4–2)
LEUKOCYTE ESTERASE UR QL STRIP.AUTO: 500
LIPASE SERPL-CCNC: 18 U/L (ref 13–75)
LYMPHOCYTES # BLD AUTO: 1.13 X10(3) UL (ref 1–4)
LYMPHOCYTES NFR BLD AUTO: 11.3 %
MCH RBC QN AUTO: 28.3 PG (ref 26–34)
MCHC RBC AUTO-ENTMCNC: 33.5 G/DL (ref 31–37)
MCV RBC AUTO: 84.4 FL
MONOCYTES # BLD AUTO: 0.58 X10(3) UL (ref 0.1–1)
MONOCYTES NFR BLD AUTO: 5.8 %
NEUTROPHILS # BLD AUTO: 8.08 X10 (3) UL (ref 1.5–7.7)
NEUTROPHILS # BLD AUTO: 8.08 X10(3) UL (ref 1.5–7.7)
NEUTROPHILS NFR BLD AUTO: 80.8 %
OSMOLALITY SERPL CALC.SUM OF ELEC: 287 MOSM/KG (ref 275–295)
PH UR: 5.5 [PH] (ref 5–8)
PLATELET # BLD AUTO: 252 10(3)UL (ref 150–450)
POTASSIUM SERPL-SCNC: 3.4 MMOL/L (ref 3.5–5.1)
PROT SERPL-MCNC: 8.9 G/DL (ref 6.4–8.2)
PROT UR-MCNC: 70 MG/DL
RBC # BLD AUTO: 4.74 X10(6)UL
RBC #/AREA URNS AUTO: >10 /HPF
RSV RNA SPEC NAA+PROBE: NEGATIVE
SARS-COV-2 RNA RESP QL NAA+PROBE: NOT DETECTED
SODIUM SERPL-SCNC: 139 MMOL/L (ref 136–145)
SP GR UR STRIP: 1.01 (ref 1–1.03)
UROBILINOGEN UR STRIP-ACNC: NORMAL
WBC # BLD AUTO: 10 X10(3) UL (ref 4–11)
WBC #/AREA URNS AUTO: >50 /HPF
WBC CLUMPS UR QL AUTO: PRESENT /HPF

## 2023-07-19 PROCEDURE — 74177 CT ABD & PELVIS W/CONTRAST: CPT | Performed by: EMERGENCY MEDICINE

## 2023-07-19 PROCEDURE — BT1F1ZZ FLUOROSCOPY OF LEFT KIDNEY, URETER AND BLADDER USING LOW OSMOLAR CONTRAST: ICD-10-PCS | Performed by: UROLOGY

## 2023-07-19 PROCEDURE — 0T778DZ DILATION OF LEFT URETER WITH INTRALUMINAL DEVICE, VIA NATURAL OR ARTIFICIAL OPENING ENDOSCOPIC: ICD-10-PCS | Performed by: UROLOGY

## 2023-07-19 PROCEDURE — 99223 1ST HOSP IP/OBS HIGH 75: CPT | Performed by: HOSPITALIST

## 2023-07-19 DEVICE — URETERAL STENT
Type: IMPLANTABLE DEVICE | Site: URETER | Status: FUNCTIONAL
Brand: ASCERTA™

## 2023-07-19 RX ORDER — HYDROMORPHONE HYDROCHLORIDE 1 MG/ML
0.4 INJECTION, SOLUTION INTRAMUSCULAR; INTRAVENOUS; SUBCUTANEOUS EVERY 5 MIN PRN
Status: DISCONTINUED | OUTPATIENT
Start: 2023-07-19 | End: 2023-07-19 | Stop reason: HOSPADM

## 2023-07-19 RX ORDER — BISACODYL 10 MG
10 SUPPOSITORY, RECTAL RECTAL
Status: DISCONTINUED | OUTPATIENT
Start: 2023-07-19 | End: 2023-07-21

## 2023-07-19 RX ORDER — MORPHINE SULFATE 4 MG/ML
4 INJECTION, SOLUTION INTRAMUSCULAR; INTRAVENOUS EVERY 2 HOUR PRN
Status: DISCONTINUED | OUTPATIENT
Start: 2023-07-19 | End: 2023-07-21

## 2023-07-19 RX ORDER — SODIUM CHLORIDE 9 MG/ML
INJECTION, SOLUTION INTRAVENOUS CONTINUOUS
Status: DISCONTINUED | OUTPATIENT
Start: 2023-07-19 | End: 2023-07-21

## 2023-07-19 RX ORDER — NALOXONE HYDROCHLORIDE 0.4 MG/ML
80 INJECTION, SOLUTION INTRAMUSCULAR; INTRAVENOUS; SUBCUTANEOUS AS NEEDED
Status: DISCONTINUED | OUTPATIENT
Start: 2023-07-19 | End: 2023-07-19 | Stop reason: HOSPADM

## 2023-07-19 RX ORDER — TEMAZEPAM 15 MG/1
15 CAPSULE ORAL NIGHTLY PRN
Status: DISCONTINUED | OUTPATIENT
Start: 2023-07-19 | End: 2023-07-21

## 2023-07-19 RX ORDER — ACETAMINOPHEN 500 MG
500 TABLET ORAL EVERY 4 HOURS PRN
Status: DISCONTINUED | OUTPATIENT
Start: 2023-07-19 | End: 2023-07-21

## 2023-07-19 RX ORDER — LIDOCAINE HYDROCHLORIDE 10 MG/ML
INJECTION, SOLUTION EPIDURAL; INFILTRATION; INTRACAUDAL; PERINEURAL AS NEEDED
Status: DISCONTINUED | OUTPATIENT
Start: 2023-07-19 | End: 2023-07-19 | Stop reason: SURG

## 2023-07-19 RX ORDER — ONDANSETRON 2 MG/ML
4 INJECTION INTRAMUSCULAR; INTRAVENOUS EVERY 6 HOURS PRN
Status: DISCONTINUED | OUTPATIENT
Start: 2023-07-19 | End: 2023-07-21

## 2023-07-19 RX ORDER — MORPHINE SULFATE 4 MG/ML
4 INJECTION, SOLUTION INTRAMUSCULAR; INTRAVENOUS EVERY 10 MIN PRN
Status: DISCONTINUED | OUTPATIENT
Start: 2023-07-19 | End: 2023-07-19 | Stop reason: HOSPADM

## 2023-07-19 RX ORDER — PROCHLORPERAZINE EDISYLATE 5 MG/ML
5 INJECTION INTRAMUSCULAR; INTRAVENOUS EVERY 8 HOURS PRN
Status: DISCONTINUED | OUTPATIENT
Start: 2023-07-19 | End: 2023-07-21

## 2023-07-19 RX ORDER — ALPRAZOLAM 0.25 MG/1
0.5 TABLET ORAL 2 TIMES DAILY PRN
Status: DISCONTINUED | OUTPATIENT
Start: 2023-07-19 | End: 2023-07-21

## 2023-07-19 RX ORDER — ALPRAZOLAM 0.5 MG/1
0.5 TABLET ORAL 2 TIMES DAILY PRN
COMMUNITY
Start: 2023-07-14

## 2023-07-19 RX ORDER — LOPERAMIDE HYDROCHLORIDE 2 MG/1
2 CAPSULE ORAL 3 TIMES DAILY PRN
COMMUNITY
Start: 2023-06-22 | End: 2023-07-22

## 2023-07-19 RX ORDER — MORPHINE SULFATE 4 MG/ML
2 INJECTION, SOLUTION INTRAMUSCULAR; INTRAVENOUS EVERY 10 MIN PRN
Status: DISCONTINUED | OUTPATIENT
Start: 2023-07-19 | End: 2023-07-19 | Stop reason: HOSPADM

## 2023-07-19 RX ORDER — ONDANSETRON 2 MG/ML
4 INJECTION INTRAMUSCULAR; INTRAVENOUS EVERY 6 HOURS PRN
Status: DISCONTINUED | OUTPATIENT
Start: 2023-07-19 | End: 2023-07-19 | Stop reason: HOSPADM

## 2023-07-19 RX ORDER — MIRTAZAPINE 30 MG/1
30 TABLET, FILM COATED ORAL NIGHTLY
COMMUNITY
Start: 2023-07-14

## 2023-07-19 RX ORDER — PROCHLORPERAZINE EDISYLATE 5 MG/ML
5 INJECTION INTRAMUSCULAR; INTRAVENOUS EVERY 8 HOURS PRN
Status: DISCONTINUED | OUTPATIENT
Start: 2023-07-19 | End: 2023-07-19 | Stop reason: HOSPADM

## 2023-07-19 RX ORDER — HYDROMORPHONE HYDROCHLORIDE 1 MG/ML
0.2 INJECTION, SOLUTION INTRAMUSCULAR; INTRAVENOUS; SUBCUTANEOUS EVERY 5 MIN PRN
Status: DISCONTINUED | OUTPATIENT
Start: 2023-07-19 | End: 2023-07-19 | Stop reason: HOSPADM

## 2023-07-19 RX ORDER — HEPARIN SODIUM 5000 [USP'U]/ML
5000 INJECTION, SOLUTION INTRAVENOUS; SUBCUTANEOUS EVERY 8 HOURS SCHEDULED
Status: DISCONTINUED | OUTPATIENT
Start: 2023-07-19 | End: 2023-07-19

## 2023-07-19 RX ORDER — DEXAMETHASONE SODIUM PHOSPHATE 4 MG/ML
VIAL (ML) INJECTION AS NEEDED
Status: DISCONTINUED | OUTPATIENT
Start: 2023-07-19 | End: 2023-07-19 | Stop reason: SURG

## 2023-07-19 RX ORDER — ONDANSETRON 2 MG/ML
4 INJECTION INTRAMUSCULAR; INTRAVENOUS EVERY 6 HOURS PRN
Status: DISCONTINUED | OUTPATIENT
Start: 2023-07-19 | End: 2023-07-19

## 2023-07-19 RX ORDER — KETOROLAC TROMETHAMINE 30 MG/ML
INJECTION, SOLUTION INTRAMUSCULAR; INTRAVENOUS AS NEEDED
Status: DISCONTINUED | OUTPATIENT
Start: 2023-07-19 | End: 2023-07-19 | Stop reason: SURG

## 2023-07-19 RX ORDER — POLYETHYLENE GLYCOL 3350 17 G/17G
17 POWDER, FOR SOLUTION ORAL DAILY PRN
Status: DISCONTINUED | OUTPATIENT
Start: 2023-07-19 | End: 2023-07-21

## 2023-07-19 RX ORDER — HEPARIN SODIUM 5000 [USP'U]/ML
5000 INJECTION, SOLUTION INTRAVENOUS; SUBCUTANEOUS EVERY 8 HOURS SCHEDULED
Status: DISCONTINUED | OUTPATIENT
Start: 2023-07-20 | End: 2023-07-21

## 2023-07-19 RX ORDER — LOPERAMIDE HYDROCHLORIDE 2 MG/1
2 CAPSULE ORAL 3 TIMES DAILY PRN
Status: DISCONTINUED | OUTPATIENT
Start: 2023-07-19 | End: 2023-07-21

## 2023-07-19 RX ORDER — ACETAMINOPHEN 500 MG
1000 TABLET ORAL ONCE
Status: COMPLETED | OUTPATIENT
Start: 2023-07-19 | End: 2023-07-19

## 2023-07-19 RX ORDER — PHENAZOPYRIDINE HYDROCHLORIDE 200 MG/1
200 TABLET, FILM COATED ORAL 3 TIMES DAILY PRN
Status: DISCONTINUED | OUTPATIENT
Start: 2023-07-19 | End: 2023-07-21

## 2023-07-19 RX ORDER — SENNOSIDES 8.6 MG
17.2 TABLET ORAL NIGHTLY
Status: DISCONTINUED | OUTPATIENT
Start: 2023-07-19 | End: 2023-07-21

## 2023-07-19 RX ORDER — HYDROMORPHONE HYDROCHLORIDE 1 MG/ML
0.6 INJECTION, SOLUTION INTRAMUSCULAR; INTRAVENOUS; SUBCUTANEOUS EVERY 5 MIN PRN
Status: DISCONTINUED | OUTPATIENT
Start: 2023-07-19 | End: 2023-07-19 | Stop reason: HOSPADM

## 2023-07-19 RX ORDER — MORPHINE SULFATE 2 MG/ML
2 INJECTION, SOLUTION INTRAMUSCULAR; INTRAVENOUS EVERY 2 HOUR PRN
Status: DISCONTINUED | OUTPATIENT
Start: 2023-07-19 | End: 2023-07-21

## 2023-07-19 RX ORDER — DOCUSATE SODIUM 100 MG/1
100 CAPSULE, LIQUID FILLED ORAL 2 TIMES DAILY
Status: DISCONTINUED | OUTPATIENT
Start: 2023-07-19 | End: 2023-07-21

## 2023-07-19 RX ORDER — MORPHINE SULFATE 2 MG/ML
1 INJECTION, SOLUTION INTRAMUSCULAR; INTRAVENOUS EVERY 2 HOUR PRN
Status: DISCONTINUED | OUTPATIENT
Start: 2023-07-19 | End: 2023-07-21

## 2023-07-19 RX ORDER — SODIUM CHLORIDE, SODIUM LACTATE, POTASSIUM CHLORIDE, CALCIUM CHLORIDE 600; 310; 30; 20 MG/100ML; MG/100ML; MG/100ML; MG/100ML
INJECTION, SOLUTION INTRAVENOUS CONTINUOUS
Status: DISCONTINUED | OUTPATIENT
Start: 2023-07-19 | End: 2023-07-19 | Stop reason: HOSPADM

## 2023-07-19 RX ORDER — DEXTROSE, SODIUM CHLORIDE, SODIUM LACTATE, POTASSIUM CHLORIDE, AND CALCIUM CHLORIDE 5; .6; .31; .03; .02 G/100ML; G/100ML; G/100ML; G/100ML; G/100ML
INJECTION, SOLUTION INTRAVENOUS CONTINUOUS
Status: DISCONTINUED | OUTPATIENT
Start: 2023-07-19 | End: 2023-07-21

## 2023-07-19 RX ORDER — SODIUM CHLORIDE, SODIUM LACTATE, POTASSIUM CHLORIDE, CALCIUM CHLORIDE 600; 310; 30; 20 MG/100ML; MG/100ML; MG/100ML; MG/100ML
INJECTION, SOLUTION INTRAVENOUS CONTINUOUS PRN
Status: DISCONTINUED | OUTPATIENT
Start: 2023-07-19 | End: 2023-07-19 | Stop reason: SURG

## 2023-07-19 RX ORDER — MORPHINE SULFATE 10 MG/ML
6 INJECTION, SOLUTION INTRAMUSCULAR; INTRAVENOUS EVERY 10 MIN PRN
Status: DISCONTINUED | OUTPATIENT
Start: 2023-07-19 | End: 2023-07-19 | Stop reason: HOSPADM

## 2023-07-19 RX ORDER — MIRTAZAPINE 15 MG/1
30 TABLET, FILM COATED ORAL NIGHTLY
Status: DISCONTINUED | OUTPATIENT
Start: 2023-07-19 | End: 2023-07-21

## 2023-07-19 RX ADMIN — SODIUM CHLORIDE, SODIUM LACTATE, POTASSIUM CHLORIDE, CALCIUM CHLORIDE: 600; 310; 30; 20 INJECTION, SOLUTION INTRAVENOUS at 18:31:00

## 2023-07-19 RX ADMIN — LIDOCAINE HYDROCHLORIDE 50 MG: 10 INJECTION, SOLUTION EPIDURAL; INFILTRATION; INTRACAUDAL; PERINEURAL at 18:35:00

## 2023-07-19 RX ADMIN — KETOROLAC TROMETHAMINE 30 MG: 30 INJECTION, SOLUTION INTRAMUSCULAR; INTRAVENOUS at 18:52:00

## 2023-07-19 RX ADMIN — SODIUM CHLORIDE, SODIUM LACTATE, POTASSIUM CHLORIDE, CALCIUM CHLORIDE: 600; 310; 30; 20 INJECTION, SOLUTION INTRAVENOUS at 18:56:00

## 2023-07-19 RX ADMIN — DEXAMETHASONE SODIUM PHOSPHATE 4 MG: 4 MG/ML VIAL (ML) INJECTION at 18:35:00

## 2023-07-19 NOTE — ANESTHESIA PROCEDURE NOTES
Airway  Date/Time: 7/19/2023 6:36 PM  Urgency: Elective      General Information and Staff    Patient location during procedure: OR  Anesthesiologist: Duarte Hall MD  Performed: anesthesiologist   Performed by: Duarte Hall MD  Authorized by: Duarte Hall MD      Indications and Patient Condition  Indications for airway management: anesthesia  Sedation level: deep  Preoxygenated: yes  Patient position: sniffing  Mask difficulty assessment: 0 - not attempted    Final Airway Details  Final airway type: supraglottic airway      Successful airway: classic  Size 5       Number of attempts at approach: 1

## 2023-07-19 NOTE — CM/SW NOTE
[facility] They are at capacity and not accepting transfers at this time.  Face sheet faxed to [phone]

## 2023-07-19 NOTE — ED INITIAL ASSESSMENT (HPI)
Patient arrives ambulatory with a fever after he had ileostomy surgery about a month ago. He states he had a 101.2F last night, did take an ibuprofen last night. No other c/o.

## 2023-07-19 NOTE — ED QUICK NOTES
Orders for admission, patient is aware of plan and ready to go upstairs. Any questions, please call ED RN joseph at extension [phone].      Patient Covid vaccination status: Partially vaccinated     COVID Test Ordered in ED: SARS-CoV-2/Flu A and B/RSV by PCR (GeneXpert)    COVID Suspicion at Admission: N/A    Running Infusions:      Mental Status/LOC at time of transport: x4    Other pertinent information:   CIWA score: N/A   NIH score:  N/A

## 2023-07-19 NOTE — H&P
[facility]    PATIENT'S NAME: Carmela Sow PHYSICIAN: Yasmine Dotson MD   PATIENT ACCOUNT#:   [id number]    LOCATION:  [address] [place] [address]  MEDICAL RECORD #:   [id number]       YOB: 1995  ADMISSION DATE:       07/19/2023    HISTORY AND PHYSICAL EXAMINATION    CHIEF COMPLAINT:  Left pyelonephritis secondary to external compression of left ureter with inflammatory focus left pelvis. HISTORY OF PRESENT ILLNESS:  The patient is a 70-year-old  male with complex past medical history of recurrent diverticulitis. Eventually he had sigmoidectomy and takedown of colovesical fistula and repair of the urinary bladder done on June 16 at an outside institution. Today he came into the emergency department for evaluation of left flank pain, fever, and chills since last night. CBC showed white blood cell count of 10.0 with left shift. Chemistry and liver function tests were unremarkable. Urinalysis showed gross urinary tract infection. Blood and urine cultures were obtained. Started on IV Rocephin. CT scan of the abdomen showed inflammatory focus of 3 x 2.8 cm in the left hemipelvis compressing the left ureter causing left hydronephrosis. Urology consult was obtained. The patient will be admitted to hospital for further management. PAST MEDICAL HISTORY:  Recurrent sigmoid diverticulitis. Eventually developed perforation and colovesicular fistula. He was seen at an outside institution and had laparoscopic sigmoidectomy, takedown of colovesicular fistula, and repair of bladder wall on June 16. On July 13, he was seen by his urologist in the office and had cystogram which showed no extravasation of the bladder wall and Muniz catheter was discontinued. The patient reported that he has been doing well since then. PAST SURGICAL HISTORY:  As mentioned sigmoidectomy and included also diverting loop ileostomy.     MEDICATIONS:  Please see medication reconciliation list.    ALLERGIES:  No known drug allergies. FAMILY HISTORY:  Positive for hypertension and diabetes mellitus type 2. SOCIAL HISTORY:  No tobacco, alcohol, or drug use. Lives with his family. Independent in his basic activities of daily living. REVIEW OF SYSTEMS:  Left flank pain associated with fever, chills, and rigors since last night associated with some urine frequency and mild dysuria. Other 12-point review of systems negative. PHYSICAL EXAMINATION:    GENERAL:  Alert. Oriented to time, place, and person. Moderate distress. VITAL SIGNS:  Temperature 101, pulse 91, respiratory rate 18, blood pressure 107/84, pulse ox 97% on room air. HEENT:  Atraumatic. Oropharynx clear, moist mucous membranes. Ears, nose normal.  Eyes:  Anicteric sclerae. NECK:  Supple. No lymphadenopathy. Trachea midline. Full range of motion. LUNGS:  Clear to auscultation bilaterally. Normal respiratory effort. HEART:  Regular rate and rhythm. S1, S2 auscultated. No murmur. ABDOMEN:  Soft, nondistended. Ileostomy right lower quadrant without complications. Left costovertebral angle tenderness noted. EXTREMITIES:  No peripheral edema, clubbing, or cyanosis. NEUROLOGIC:  Motor and sensory intact. ASSESSMENT AND PLAN:  Ascending urinary tract infection with left pyelonephritis secondary to compression of left ureter with inflammatory versus scar tissue focus left hemipelvis with hydronephrosis. The patient will be admitted to general medical floor. Blood and urine cultures obtained in the emergency room, IV Rocephin was given, urology consult for cystoscopy and left ureteral stent, monitor his hemodynamic status, follow up on blood and urine cultures, pain control. Further recommendations to follow.     Dictated By Kimberli Rich MD  d: 07/19/2023 11:23:09  t: 07/19/2023 11:29:23  Job [id number]  FB/

## 2023-07-19 NOTE — ED QUICK NOTES
Patient awaiting second set of blood culture draw, charge nurse,[facility], called phlebotomy for draw, updated with plan of care, complains of back pain, states he can tolerate the pain at present, denies nausea, will continue to monitor

## 2023-07-19 NOTE — ED QUICK NOTES
Pt.'s Dr. Nanci Lucas urology group was re-paged. Nurse reports Dr. Juan R Mccloud is not in clinic and a oncall urologist will return page in 1-2 business days.

## 2023-07-19 NOTE — CM/SW NOTE
Daniel Lomeli called from [facility]. Provided him with some pt information. He stated they are at capacity but the pt is on their list for transfers. They will likely call us tomorrow for possible transfer. Care Coordination [facility] Transfer Note:  Reason for transfer:     Continuity of care    Request initiated by:    Dr Divina Meraz ED, MD      Active Acute Medical issue:    Patient Active Problem List:     Diverticulitis     Acute diverticulitis     Diverticulitis of colon     Lower abdominal pain     Pyelonephritis         L pyleo  UTI   Urosepsis  L hydro     Anticipated Transfer Plan:   [facility] called, plan of care discussed with transfer center RN. Face sheet faxed and copy of imaging has been requested for transport, bedside RN was updated. Patient/family was notified  by the provider and are agreeable to the plan. Complex Transitions and Transfer Center [facility] is facilitating coordination of care and will follow.      [facility] #: David De La Cruz is at capacity and not able to accept transfers, he is on the list.

## 2023-07-20 LAB
ANION GAP SERPL CALC-SCNC: 6 MMOL/L (ref 0–18)
BASOPHILS # BLD AUTO: 0.01 X10(3) UL (ref 0–0.2)
BASOPHILS NFR BLD AUTO: 0.1 %
BUN BLD-MCNC: 7 MG/DL (ref 7–18)
BUN/CREAT SERPL: 9.5 (ref 10–20)
C DIFF TOX B STL QL: NEGATIVE
CALCIUM BLD-MCNC: 8.9 MG/DL (ref 8.5–10.1)
CHLORIDE SERPL-SCNC: 111 MMOL/L (ref 98–112)
CO2 SERPL-SCNC: 25 MMOL/L (ref 21–32)
CREAT BLD-MCNC: 0.74 MG/DL
DEPRECATED RDW RBC AUTO: 40.9 FL (ref 35.1–46.3)
EOSINOPHIL # BLD AUTO: 0 X10(3) UL (ref 0–0.7)
EOSINOPHIL NFR BLD AUTO: 0 %
ERYTHROCYTE [DISTWIDTH] IN BLOOD BY AUTOMATED COUNT: 13 % (ref 11–15)
GFR SERPLBLD BASED ON 1.73 SQ M-ARVRAT: 126 ML/MIN/1.73M2 (ref 60–?)
GLUCOSE BLD-MCNC: 112 MG/DL (ref 70–99)
HCT VFR BLD AUTO: 36.3 %
HGB BLD-MCNC: 12 G/DL
IMM GRANULOCYTES # BLD AUTO: 0.03 X10(3) UL (ref 0–1)
IMM GRANULOCYTES NFR BLD: 0.3 %
LYMPHOCYTES # BLD AUTO: 1.01 X10(3) UL (ref 1–4)
LYMPHOCYTES NFR BLD AUTO: 9.9 %
MCH RBC QN AUTO: 28.6 PG (ref 26–34)
MCHC RBC AUTO-ENTMCNC: 33.1 G/DL (ref 31–37)
MCV RBC AUTO: 86.4 FL
MONOCYTES # BLD AUTO: 0.58 X10(3) UL (ref 0.1–1)
MONOCYTES NFR BLD AUTO: 5.7 %
NEUTROPHILS # BLD AUTO: 8.53 X10 (3) UL (ref 1.5–7.7)
NEUTROPHILS # BLD AUTO: 8.53 X10(3) UL (ref 1.5–7.7)
NEUTROPHILS NFR BLD AUTO: 84 %
OSMOLALITY SERPL CALC.SUM OF ELEC: 293 MOSM/KG (ref 275–295)
PLATELET # BLD AUTO: 218 10(3)UL (ref 150–450)
POTASSIUM SERPL-SCNC: 4 MMOL/L (ref 3.5–5.1)
RBC # BLD AUTO: 4.2 X10(6)UL
SODIUM SERPL-SCNC: 142 MMOL/L (ref 136–145)
WBC # BLD AUTO: 10.2 X10(3) UL (ref 4–11)

## 2023-07-20 PROCEDURE — 99233 SBSQ HOSP IP/OBS HIGH 50: CPT | Performed by: HOSPITALIST

## 2023-07-20 NOTE — OPERATIVE REPORT
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 He was draped and prepped in usual sterile fashion lithotomy position he underwent a standard timeout procedure and ready had gotten IV ceftriaxone and Venodyne's. We performed rigid cystoscopy and was noted to have a normal urethra nonobstructing prostate. Visualization of the bladder did not note herald patch and no particulate matter in the bladder. The bladder looked healed from his previous sigmoid colectomy and fistula surgery. We initially identified left ureteral orifice and placed a 5 Azerbaijani ureteral stent couple centimeters and then performed a very gentle retrograde pyelogram.  This noted a 4 cm stricture of the ureter starting iliac vessels going Distally. The  stricture was very tight and just proximal to the stricture of the ureter was severely dilated to  a couple centimeters in size and  in the kidney there was moderate to severe hydronephrosis above the stricture. We placed a sensor guidewire and then we placed a 6 x 26 double-J stent the stricture was very tight and pushed somewhat to get stent up into the kidney. We will see how it is draining and if there is any issue he will probably need left nephrostomy tube hydronephrosis persists fluoroscopy time was less than 1 minute good curls of the ureteral stent were noted in the kidney and bladder and the bladder was drained at the end of the case.     Keyon Noyola MD  7/19/2023  7:04 PM

## 2023-07-20 NOTE — ANESTHESIA POSTPROCEDURE EVALUATION
Patient: Marivel Martínez    Procedure Summary       Date: 07/19/23 Room / Location: [address] [place] [address] MAIN OR 14 / [address] [place] [address] MAIN OR    Anesthesia Start: 8043 Anesthesia Stop: 1905    Procedure: CYSTOSCOPY left STENT INSERTION (Left: Ureter) Diagnosis:       Hydronephrosis      (Hydronephrosis [N13.30])    Surgeons: Sunny Phalen, MD Anesthesiologist: Rena Fernandes MD    Anesthesia Type: general ASA Status: 2 - Emergent            Anesthesia Type: general    Vitals Value Taken Time   BP 99/62 07/19/23 1906   Temp 97.2 07/19/23 1912   Pulse 102 07/19/23 1912   Resp 18 07/19/23 1912   SpO2 98 % 07/19/23 1912   Vitals shown include unvalidated device data.     [address] [place] [address] AN Post Evaluation:   Patient Evaluated in PACU  Patient Participation: waiting for patient participation  Level of Consciousness: sleepy but conscious  Pain Score: 0  Pain Management: adequate  Airway Patency:patent  Yes    Cardiovascular Status: acceptable and hemodynamically stable  Respiratory Status: acceptable, spontaneous ventilation and nonlabored ventilation  Postoperative Hydration euvolemic      Michael Theron Russo MD  7/19/2023 7:12 PM

## 2023-07-20 NOTE — BRIEF OP NOTE
Pre-Operative Diagnosis: Hydronephrosis [N13.30]     Post-Operative Diagnosis: Hydronephrosis [N13.30]      Procedure Performed:   CYSTOSCOPY left STENT INSERTION  Left Retrograde Urogram     Surgeon(s) and Role:     * Mili Saucedo MD - Primary    Assistant(s):  None       Surgical Findings: No herald patch,  Left  4 cm stricture - tight over the iliac vessels      Specimen: None      Estimated Blood Loss: No data recorded    Dictation Number:  See op note     Dennis Carr MD  7/19/2023  7:04 PM

## 2023-07-20 NOTE — CM/SW NOTE
Care Coordination [facility] Transfer Note:  Reason for transfer:      Continuity of care     Request initiated by:     Dr Vanessa Clifford ED, MD        Active Acute Medical issue:     Patient Active Problem List:     Diverticulitis     Acute diverticulitis     Diverticulitis of colon     Lower abdominal pain     Pyelonephritis       L pyleo  UTI   Urosepsis  L hydro     7/19/23 - Cystoscopy Left stent insertion    Anticipated Transfer Plan:   [facility] called, plan of care discussed with transfer center RN. Face sheet faxed and copy of imaging has been requested for transport, bedside RN was updated. Patient/family was notified  by the provider and are agreeable to the plan. Complex Transitions and Transfer Center [facility] is facilitating coordination of care and will follow.       [facility] #: [phone]     Called [facility] and spoke with Lisset Berger RN in the transfer center and he stated that they are at capacity and are not accepting any transfers currently  Patient is on the waiting list.

## 2023-07-21 VITALS
OXYGEN SATURATION: 96 % | SYSTOLIC BLOOD PRESSURE: 112 MMHG | HEIGHT: 69 IN | WEIGHT: 226 LBS | TEMPERATURE: 98 F | BODY MASS INDEX: 33.47 KG/M2 | RESPIRATION RATE: 18 BRPM | HEART RATE: 97 BPM | DIASTOLIC BLOOD PRESSURE: 68 MMHG

## 2023-07-21 LAB
ANION GAP SERPL CALC-SCNC: 4 MMOL/L (ref 0–18)
BASOPHILS # BLD AUTO: 0.02 X10(3) UL (ref 0–0.2)
BASOPHILS NFR BLD AUTO: 0.2 %
BUN BLD-MCNC: 5 MG/DL (ref 7–18)
BUN/CREAT SERPL: 8.1 (ref 10–20)
CALCIUM BLD-MCNC: 8 MG/DL (ref 8.5–10.1)
CHLORIDE SERPL-SCNC: 113 MMOL/L (ref 98–112)
CO2 SERPL-SCNC: 25 MMOL/L (ref 21–32)
CREAT BLD-MCNC: 0.62 MG/DL
DEPRECATED RDW RBC AUTO: 40.4 FL (ref 35.1–46.3)
EGFRCR SERPLBLD CKD-EPI 2021: 133 ML/MIN/1.73M2 (ref 60–?)
EOSINOPHIL # BLD AUTO: 0.06 X10(3) UL (ref 0–0.7)
EOSINOPHIL NFR BLD AUTO: 0.7 %
ERYTHROCYTE [DISTWIDTH] IN BLOOD BY AUTOMATED COUNT: 13 % (ref 11–15)
GLUCOSE BLD-MCNC: 95 MG/DL (ref 70–99)
HCT VFR BLD AUTO: 34 %
HGB BLD-MCNC: 11.3 G/DL
IMM GRANULOCYTES # BLD AUTO: 0.03 X10(3) UL (ref 0–1)
IMM GRANULOCYTES NFR BLD: 0.3 %
LYMPHOCYTES # BLD AUTO: 1.92 X10(3) UL (ref 1–4)
LYMPHOCYTES NFR BLD AUTO: 21.3 %
MAGNESIUM SERPL-MCNC: 1.7 MG/DL (ref 1.6–2.6)
MCH RBC QN AUTO: 28.5 PG (ref 26–34)
MCHC RBC AUTO-ENTMCNC: 33.2 G/DL (ref 31–37)
MCV RBC AUTO: 85.6 FL
MONOCYTES # BLD AUTO: 0.69 X10(3) UL (ref 0.1–1)
MONOCYTES NFR BLD AUTO: 7.6 %
NEUTROPHILS # BLD AUTO: 6.3 X10 (3) UL (ref 1.5–7.7)
NEUTROPHILS # BLD AUTO: 6.3 X10(3) UL (ref 1.5–7.7)
NEUTROPHILS NFR BLD AUTO: 69.9 %
OSMOLALITY SERPL CALC.SUM OF ELEC: 291 MOSM/KG (ref 275–295)
PHOSPHATE SERPL-MCNC: 2.7 MG/DL (ref 2.5–4.9)
PLATELET # BLD AUTO: 185 10(3)UL (ref 150–450)
POTASSIUM SERPL-SCNC: 3.4 MMOL/L (ref 3.5–5.1)
RBC # BLD AUTO: 3.97 X10(6)UL
SODIUM SERPL-SCNC: 142 MMOL/L (ref 136–145)
WBC # BLD AUTO: 9 X10(3) UL (ref 4–11)

## 2023-07-21 PROCEDURE — 99239 HOSP IP/OBS DSCHRG MGMT >30: CPT | Performed by: HOSPITALIST

## 2023-07-21 RX ORDER — LEVOFLOXACIN 750 MG/1
750 TABLET ORAL DAILY
Qty: 7 TABLET | Refills: 0 | Status: SHIPPED | OUTPATIENT
Start: 2023-07-21 | End: 2023-07-28

## 2023-07-21 RX ORDER — PSEUDOEPHEDRINE HCL 30 MG
100 TABLET ORAL 2 TIMES DAILY PRN
Qty: 20 CAPSULE | Refills: 0 | Status: SHIPPED | OUTPATIENT
Start: 2023-07-21

## 2023-07-21 RX ORDER — MAGNESIUM OXIDE 400 MG/1
400 TABLET ORAL ONCE
Status: COMPLETED | OUTPATIENT
Start: 2023-07-21 | End: 2023-07-21

## 2023-07-21 RX ORDER — ACETAMINOPHEN 500 MG
500 TABLET ORAL EVERY 4 HOURS PRN
Qty: 1 TABLET | Refills: 0 | Status: SHIPPED | COMMUNITY
Start: 2023-07-21

## 2023-07-21 RX ORDER — PHENAZOPYRIDINE HYDROCHLORIDE 200 MG/1
200 TABLET, FILM COATED ORAL 3 TIMES DAILY PRN
Qty: 6 TABLET | Refills: 0 | Status: SHIPPED | OUTPATIENT
Start: 2023-07-21

## 2023-07-21 RX ORDER — POTASSIUM CHLORIDE 20 MEQ/1
40 TABLET, EXTENDED RELEASE ORAL ONCE
Status: COMPLETED | OUTPATIENT
Start: 2023-07-21 | End: 2023-07-21

## 2023-07-21 NOTE — DISCHARGE SUMMARY
Dc summary#[id number]  > 30 min spent on [address] [place] [address] Discharge Diagnoses: uti with hydronephrosis    Lace+ Score: 47  59-90 High Risk  29-58 Medium Risk  0-28   Low Risk. TCM Follow-Up Recommendation:  LACE > 58:  High Risk of readmission after discharge from the hospital.

## 2023-07-21 NOTE — PROGRESS NOTES
07/21/23 0840   Over the last 2 weeks, how often have you been bothered by any of the following problems? Little interest or pleasure in doing things 3   Feeling down, depressed, or hopeless 3   Trouble falling or staying asleep, or sleeping too much 3   Feeling tired or having little energy 3   Poor appetite or overeating 3   Feeling bad about yourself - or that you are a failure or have let yourself or your family down 1   Trouble concentrating on things, such as reading the newspaper or watching television 3   Moving or speaking so slowly that other people could have noticed. Or the opposite - being so fidgety or restless that you have been moving around a lot more than usual 0   Thoughts that you would be better off dead, or of hurting yourself in some way 0   PHQ-9 TOTAL SCORE 19   If you checked off any problems, how difficult have these problems made it for you to do your work, take care of things at home, or get along with other people? Extremely dIfficult        07/21/23 0841   JANICE 7 Over the last 2 weeks, how often have you been bothered by the following problems? Feeling nervous, anxious, or on edge 3   Not being able to stop or control worrying 3   Worrying too much about different things    3   Trouble relaxing 3   Being so restless that it's hard to sit still 3   Becoming easily annoyed or irritable 1   Feeling afraid as if something awful might happen 1   JANICE 7 Total Score 17   If you checked off any problems, how difficult have these made it for you to do your work, take care of things at home, or get along with other people? Extremely difficult     Pt reported that he has been regularly seeing a therapist and psychiatrist since March 2023. Pt reported that most of his depression and anxiety symptoms have been present since his medical diagnosis. Pt has trouble sleeping and staying asleep and is prescribed medications by his psychiatrist that have been helpful.  Pt also reported having a prescription for Xanax as needed for panic attacks. Pt reported that he is happy with his current outpatient therapist and psychiatrist and is not in need of any referrals at this time.

## 2023-07-21 NOTE — DISCHARGE INSTRUCTIONS
[facility] primary[facility]surgery asap  [facility] urology 2 weeks  Please send home with inc estela  Pt states no work note needed  Please see [facility]prev counselers for refill home meds     Medication List        START taking these medications      acetaminophen 500 MG Tabs  Commonly known as: Tylenol Extra Strength     docusate sodium 100 MG Caps  Commonly known as: COLACE  Take 100 mg by mouth 2 (two) times daily as needed for constipation. levoFLOXacin 750 MG Tabs  Commonly known as: Levaquin  Take 1 tablet (750 mg total) by mouth daily for 7 doses. Stop if rash; watch for palp/achilles tendon pain/visual changes     phenazopyridine 200 MG Tabs  Commonly known as: Pyridum  Take 1 tablet (200 mg total) by mouth 3 (three) times daily as needed. Watch for orange urine/sweat/tears which may stain     Sennosides 17.2 MG Tabs  Take 1 tablet (17.2 mg total) by mouth nightly.             CONTINUE taking these medications      ALPRAZolam 0.5 MG Tabs  Commonly known as: Xanax     loperamide 2 MG Caps  Commonly known as: Imodium     mirtazapine 30 MG Tabs  Commonly known as: Remeron               Where to Get Your Medications        These medications were sent to [address] [place] [address] AT [address] [place] [address] Road, [phone], [phone]  [address] [address]      Hours: 24-hours Phone: [phone]   docusate sodium 100 MG Caps  levoFLOXacin 750 MG Tabs  phenazopyridine 200 MG Tabs  Sennosides 17.2 MG Tabs

## 2023-07-21 NOTE — PAYOR COMM NOTE
--------------  DISCHARGE REVIEW    Payor: Mikel Estevez #:  [id number]  Authorization Number: [id number]    Admit date: 7/19/23  Admit time:  11:56 AM  Discharge Date: 7/21/2023 11:00 AM     Admitting Physician:   Attending Physician:  No att. providers found  Primary Care Physician: Pcp, None          Discharge Summary Notes        Discharge Summary signed by Cat Dunham MD at 7/21/2023  9:53 AM       Author: Cat Dunham MD Specialty: HOSPITALIST Author Type: Physician    Filed: 7/21/2023  9:53 AM Date of Service: 7/21/2023  9:52 AM Status: Signed    : Cat Dunham MD (Physician)         Dc summary#  > 30 min spent on [facility] [place] Discharge Diagnoses: uti with hydronephrosis    Lace+ Score: 47  59-90 High Risk  29-58 Medium Risk  0-28   Low Risk. TCM Follow-Up Recommendation:  LACE > 58:  High Risk of readmission after discharge from the hospital.       Electronically signed by Cat Dunham MD on 7/21/2023  9:53 AM         REVIEWER COMMENTS

## 2023-07-24 ENCOUNTER — PATIENT OUTREACH (OUTPATIENT)
Dept: CASE MANAGEMENT | Age: 28
End: 2023-07-24

## 2023-07-24 NOTE — DISCHARGE SUMMARY
[facility]    PATIENT'S NAME: NATANAEL SHARMA   ATTENDING PHYSICIAN: Blake Cerda MD   PATIENT ACCOUNT#:   [id number]    LOCATION:  [address] [place] [address]  MEDICAL RECORD #:   [id number]       YOB: 1995  ADMISSION DATE:       07/19/2023      DISCHARGE DATE:  07/21/2023    DISCHARGE SUMMARY    About 45 minutes were spent preparing this discharge. DISCHARGE DIAGNOSIS:  Pyelonephritis/urinary tract infection with hydronephrosis from some possibly postsurgical scar or mass causing external compression of the ureter. HISTORY AND HOSPITAL COURSE:  This is a very pleasant 78-year-old,  American male who presents with a history of having come in with pain and fever and chills since the last night. He recently had a colovesicular fistula repair at the [address] [place] [address] and he has a colostomy after he had been found to have ruptured diverticulitis also treated at the [address] [place] [address]. He was admitted to the hospital, placed on antibiotics and seen by Ms. Sukhjinder Goldberg, of Urology and patient had a stent performed by Dr. Amarjit Aviles without difficulty. The patient tolerated the procedure well. Cultures were done and has E coli in his urine was proved to sensitive to Levaquin. We were able to discharge him home on p.o. Levaquin to follow up with the [address] [place] [address] to see if anything surgical could be done with this compressive mass and perhaps to reverse his colostomy. PHYSICAL EXAMINATION ON DISCHARGE:  VITAL SIGNS:  Temperature is 98.2, pulse 97, respiratory rate 18, blood pressure is 112/68, 96%. LUNGS:  Occasional rhonchi. HEART:  Normal S1, S2, no S3.  ABDOMEN:  Soft. Essentially nontender. Colostomy functioning. NEUROLOGIC:  Neurologically alert and oriented, friendly and cooperative. LABORATORY STUDIES:  Please see chart. ASSESSMENT AND PLAN:  1. Urinary tract infection from compression status post stent. Continue Levaquin.   Patient now symptom free. Likely had pyelonephritis. 2.   Recent surgery for ruptured diverticulitis complicated with small compressive mass in the lower abdomen. Follow up with [facility]. 3.   Obesity with body mass index 33.37. Needs GREGORIO workup. 4.   Anxiety and depression. Patient seen by Psychiatry, resources given. CONDITION ON DISCHARGE:  Stable. CODE STATUS:  Full Code. DIET:  As tolerated. FOLLOWUP:  Follow up with the [facility], primary, and Surgery as soon as possible. Follow up with Ms. Ursula Patel PA-C, in about 2 weeks. Return if fever, chills, sweat, nausea, vomiting, chest pain, shortness of breath, or other complaints. DISCHARGE MEDICATIONS:  1. Alprazolam 0.5 mg twice a day as needed for anxiety. 2.   Imodium 2 mg 3 times a day as needed for diarrhea. Please hold if fever or bloody diarrhea. 3.   Remeron 30 mg nightly. Watch for severe muscle stiffness and fever. 4.   Levaquin 750 mg daily for 7 doses. Watch for palpitations, Achilles tendon pain, and visual changes. Stop and call if rash. 5.   Senokot 1 tablet 17.2 mg nightly. 6.   Pyridium 200 mg 3 times a day as needed. Watch for orange urine, sweat, and tears. May stain clothing and contact lenses. 7.   Colace 100 mg p.o. b.i.d. p.r.n. constipation. 8.   Tylenol 500 mg every 4 to 6 hours p.r.n. pain. Watch total daily Tylenol, limit to 3 g. RISK OF READMISSION:  High. TCM followup recommended. Dictated By Susana Beltran.  MD Zaida  d: 07/21/2023 18:12:35  t: 07/22/2023 00:13:22  Job [id number]  LAS/

## 2023-07-24 NOTE — PROGRESS NOTES
TCM chart review. No TCM as patient follows with outside [facility] PCP. Encounter closing.

## 2023-08-06 ENCOUNTER — HOSPITAL ENCOUNTER (INPATIENT)
Facility: HOSPITAL | Age: 28
LOS: 1 days | Discharge: HOME | End: 2023-08-08
Attending: EMERGENCY MEDICINE | Admitting: HOSPITALIST
Payer: MEDICAID

## 2023-08-06 ENCOUNTER — APPOINTMENT (OUTPATIENT)
Dept: CT IMAGING | Facility: HOSPITAL | Age: 28
End: 2023-08-06
Attending: EMERGENCY MEDICINE
Payer: MEDICAID

## 2023-08-06 DIAGNOSIS — A41.9 SEPSIS DUE TO UNDETERMINED ORGANISM (HCC): ICD-10-CM

## 2023-08-06 DIAGNOSIS — N39.0 COMPLICATED UTI (URINARY TRACT INFECTION): Primary | ICD-10-CM

## 2023-08-06 LAB
ALBUMIN SERPL-MCNC: 3.5 G/DL (ref 3.4–5)
ALBUMIN/GLOB SERPL: 0.6 {RATIO} (ref 1–2)
ALP LIVER SERPL-CCNC: 84 U/L
ALT SERPL-CCNC: 20 U/L
ANION GAP SERPL CALC-SCNC: 7 MMOL/L (ref 0–18)
AST SERPL-CCNC: 14 U/L (ref 15–37)
BASOPHILS # BLD AUTO: 0.06 X10(3) UL (ref 0–0.2)
BASOPHILS NFR BLD AUTO: 0.6 %
BILIRUB SERPL-MCNC: 1.3 MG/DL (ref 0.1–2)
BILIRUB UR QL: NEGATIVE
BUN BLD-MCNC: 9 MG/DL (ref 7–18)
BUN/CREAT SERPL: 8 (ref 10–20)
C DIFF TOX B STL QL: NEGATIVE
CALCIUM BLD-MCNC: 8.9 MG/DL (ref 8.5–10.1)
CHLORIDE SERPL-SCNC: 108 MMOL/L (ref 98–112)
CO2 SERPL-SCNC: 22 MMOL/L (ref 21–32)
COLOR UR: YELLOW
CREAT BLD-MCNC: 1.12 MG/DL
DEPRECATED RDW RBC AUTO: 41.6 FL (ref 35.1–46.3)
EGFRCR SERPLBLD CKD-EPI 2021: 91 ML/MIN/1.73M2 (ref 60–?)
EOSINOPHIL # BLD AUTO: 0.07 X10(3) UL (ref 0–0.7)
EOSINOPHIL NFR BLD AUTO: 0.7 %
ERYTHROCYTE [DISTWIDTH] IN BLOOD BY AUTOMATED COUNT: 13.2 % (ref 11–15)
GLOBULIN PLAS-MCNC: 5.5 G/DL (ref 2.8–4.4)
GLUCOSE BLD-MCNC: 130 MG/DL (ref 70–99)
GLUCOSE UR-MCNC: NORMAL MG/DL
HCT VFR BLD AUTO: 41.2 %
HGB BLD-MCNC: 13.6 G/DL
IMM GRANULOCYTES # BLD AUTO: 0.04 X10(3) UL (ref 0–1)
IMM GRANULOCYTES NFR BLD: 0.4 %
KETONES UR-MCNC: NEGATIVE MG/DL
LACTATE SERPL-SCNC: 2.6 MMOL/L (ref 0.4–2)
LEUKOCYTE ESTERASE UR QL STRIP.AUTO: 500
LYMPHOCYTES # BLD AUTO: 1 X10(3) UL (ref 1–4)
LYMPHOCYTES NFR BLD AUTO: 9.8 %
MCH RBC QN AUTO: 28.2 PG (ref 26–34)
MCHC RBC AUTO-ENTMCNC: 33 G/DL (ref 31–37)
MCV RBC AUTO: 85.5 FL
MONOCYTES # BLD AUTO: 0.64 X10(3) UL (ref 0.1–1)
MONOCYTES NFR BLD AUTO: 6.3 %
NEUTROPHILS # BLD AUTO: 8.35 X10 (3) UL (ref 1.5–7.7)
NEUTROPHILS # BLD AUTO: 8.35 X10(3) UL (ref 1.5–7.7)
NEUTROPHILS NFR BLD AUTO: 82.2 %
OSMOLALITY SERPL CALC.SUM OF ELEC: 284 MOSM/KG (ref 275–295)
PH UR: 6 [PH] (ref 5–8)
PLATELET # BLD AUTO: 276 10(3)UL (ref 150–450)
POTASSIUM SERPL-SCNC: 3.4 MMOL/L (ref 3.5–5.1)
PROCALCITONIN SERPL-MCNC: 0.02 NG/ML (ref ?–0.16)
PROT SERPL-MCNC: 9 G/DL (ref 6.4–8.2)
PROT UR-MCNC: 50 MG/DL
RBC # BLD AUTO: 4.82 X10(6)UL
RBC #/AREA URNS AUTO: >10 /HPF
SARS-COV-2 RNA RESP QL NAA+PROBE: NOT DETECTED
SODIUM SERPL-SCNC: 137 MMOL/L (ref 136–145)
SP GR UR STRIP: 1.02 (ref 1–1.03)
UROBILINOGEN UR STRIP-ACNC: NORMAL
WBC # BLD AUTO: 10.2 X10(3) UL (ref 4–11)
WBC #/AREA URNS AUTO: >50 /HPF

## 2023-08-06 PROCEDURE — 74177 CT ABD & PELVIS W/CONTRAST: CPT | Performed by: EMERGENCY MEDICINE

## 2023-08-06 RX ORDER — ACETAMINOPHEN 325 MG/1
650 TABLET ORAL ONCE
Status: COMPLETED | OUTPATIENT
Start: 2023-08-06 | End: 2023-08-06

## 2023-08-06 RX ORDER — ONDANSETRON 2 MG/ML
4 INJECTION INTRAMUSCULAR; INTRAVENOUS ONCE
Status: COMPLETED | OUTPATIENT
Start: 2023-08-06 | End: 2023-08-06

## 2023-08-07 PROBLEM — N39.0 COMPLICATED UTI (URINARY TRACT INFECTION): Status: ACTIVE | Noted: 2023-08-07

## 2023-08-07 PROBLEM — A41.59 ENTEROBACTER SEPSIS (HCC): Status: ACTIVE | Noted: 2023-08-07

## 2023-08-07 LAB
ANION GAP SERPL CALC-SCNC: 5 MMOL/L (ref 0–18)
BASOPHILS # BLD AUTO: 0.03 X10(3) UL (ref 0–0.2)
BASOPHILS NFR BLD AUTO: 0.3 %
BUN BLD-MCNC: 7 MG/DL (ref 7–18)
BUN/CREAT SERPL: 8.4 (ref 10–20)
CALCIUM BLD-MCNC: 8.4 MG/DL (ref 8.5–10.1)
CHLORIDE SERPL-SCNC: 110 MMOL/L (ref 98–112)
CO2 SERPL-SCNC: 23 MMOL/L (ref 21–32)
CREAT BLD-MCNC: 0.83 MG/DL
DEPRECATED RDW RBC AUTO: 42 FL (ref 35.1–46.3)
EGFRCR SERPLBLD CKD-EPI 2021: 122 ML/MIN/1.73M2 (ref 60–?)
EOSINOPHIL # BLD AUTO: 0.04 X10(3) UL (ref 0–0.7)
EOSINOPHIL NFR BLD AUTO: 0.4 %
ERYTHROCYTE [DISTWIDTH] IN BLOOD BY AUTOMATED COUNT: 13.3 % (ref 11–15)
GLUCOSE BLD-MCNC: 117 MG/DL (ref 70–99)
HCT VFR BLD AUTO: 35.7 %
HGB BLD-MCNC: 11.8 G/DL
IMM GRANULOCYTES # BLD AUTO: 0.04 X10(3) UL (ref 0–1)
IMM GRANULOCYTES NFR BLD: 0.4 %
LACTATE SERPL-SCNC: 0.8 MMOL/L (ref 0.4–2)
LYMPHOCYTES # BLD AUTO: 1.36 X10(3) UL (ref 1–4)
LYMPHOCYTES NFR BLD AUTO: 13.6 %
MCH RBC QN AUTO: 28.6 PG (ref 26–34)
MCHC RBC AUTO-ENTMCNC: 33.1 G/DL (ref 31–37)
MCV RBC AUTO: 86.4 FL
MONOCYTES # BLD AUTO: 0.9 X10(3) UL (ref 0.1–1)
MONOCYTES NFR BLD AUTO: 9 %
NEUTROPHILS # BLD AUTO: 7.64 X10 (3) UL (ref 1.5–7.7)
NEUTROPHILS # BLD AUTO: 7.64 X10(3) UL (ref 1.5–7.7)
NEUTROPHILS NFR BLD AUTO: 76.3 %
OSMOLALITY SERPL CALC.SUM OF ELEC: 285 MOSM/KG (ref 275–295)
PLATELET # BLD AUTO: 226 10(3)UL (ref 150–450)
POTASSIUM SERPL-SCNC: 3.7 MMOL/L (ref 3.5–5.1)
RBC # BLD AUTO: 4.13 X10(6)UL
SODIUM SERPL-SCNC: 138 MMOL/L (ref 136–145)
WBC # BLD AUTO: 10 X10(3) UL (ref 4–11)

## 2023-08-07 PROCEDURE — 99223 1ST HOSP IP/OBS HIGH 75: CPT | Performed by: HOSPITALIST

## 2023-08-07 RX ORDER — MAGNESIUM HYDROXIDE/ALUMINUM HYDROXICE/SIMETHICONE 120; 1200; 1200 MG/30ML; MG/30ML; MG/30ML
30 SUSPENSION ORAL 4 TIMES DAILY PRN
Status: DISCONTINUED | OUTPATIENT
Start: 2023-08-07 | End: 2023-08-08

## 2023-08-07 RX ORDER — ACETAMINOPHEN 325 MG/1
650 TABLET ORAL EVERY 6 HOURS PRN
Status: DISCONTINUED | OUTPATIENT
Start: 2023-08-07 | End: 2023-08-08

## 2023-08-07 RX ORDER — MIRTAZAPINE 30 MG/1
30 TABLET, FILM COATED ORAL NIGHTLY
Status: DISCONTINUED | OUTPATIENT
Start: 2023-08-07 | End: 2023-08-08

## 2023-08-07 RX ORDER — DEXTROSE AND SODIUM CHLORIDE 5; .45 G/100ML; G/100ML
INJECTION, SOLUTION INTRAVENOUS CONTINUOUS
Status: ACTIVE | OUTPATIENT
Start: 2023-08-07 | End: 2023-08-07

## 2023-08-07 RX ORDER — ONDANSETRON 2 MG/ML
4 INJECTION INTRAMUSCULAR; INTRAVENOUS EVERY 6 HOURS PRN
Status: DISCONTINUED | OUTPATIENT
Start: 2023-08-07 | End: 2023-08-08

## 2023-08-07 RX ORDER — DEXTROSE MONOHYDRATE, SODIUM CHLORIDE, AND POTASSIUM CHLORIDE 50; 1.49; 9 G/1000ML; G/1000ML; G/1000ML
INJECTION, SOLUTION INTRAVENOUS CONTINUOUS
Status: DISCONTINUED | OUTPATIENT
Start: 2023-08-07 | End: 2023-08-08

## 2023-08-07 RX ORDER — ZOLPIDEM TARTRATE 5 MG/1
5 TABLET ORAL NIGHTLY PRN
Status: DISCONTINUED | OUTPATIENT
Start: 2023-08-07 | End: 2023-08-08

## 2023-08-07 RX ORDER — ALPRAZOLAM 0.5 MG/1
0.5 TABLET ORAL 2 TIMES DAILY PRN
Status: DISCONTINUED | OUTPATIENT
Start: 2023-08-07 | End: 2023-08-08

## 2023-08-07 RX ORDER — HEPARIN SODIUM 5000 [USP'U]/ML
5000 INJECTION, SOLUTION INTRAVENOUS; SUBCUTANEOUS EVERY 12 HOURS SCHEDULED
Status: DISCONTINUED | OUTPATIENT
Start: 2023-08-07 | End: 2023-08-08

## 2023-08-07 RX ORDER — HYDROCODONE BITARTRATE AND ACETAMINOPHEN 5; 325 MG/1; MG/1
1 TABLET ORAL EVERY 6 HOURS PRN
Status: DISCONTINUED | OUTPATIENT
Start: 2023-08-07 | End: 2023-08-08

## 2023-08-07 RX ORDER — HEPARIN SODIUM 5000 [USP'U]/ML
5000 INJECTION, SOLUTION INTRAVENOUS; SUBCUTANEOUS EVERY 12 HOURS
Status: DISCONTINUED | OUTPATIENT
Start: 2023-08-07 | End: 2023-08-07

## 2023-08-07 RX ORDER — LOPERAMIDE HYDROCHLORIDE 2 MG/1
2 CAPSULE ORAL 4 TIMES DAILY PRN
Status: DISCONTINUED | OUTPATIENT
Start: 2023-08-07 | End: 2023-08-08

## 2023-08-07 RX ORDER — POTASSIUM CHLORIDE 1.5 G/1.58G
40 POWDER, FOR SOLUTION ORAL ONCE
Status: COMPLETED | OUTPATIENT
Start: 2023-08-07 | End: 2023-08-07

## 2023-08-07 RX ORDER — PANTOPRAZOLE SODIUM 40 MG/1
40 TABLET, DELAYED RELEASE ORAL
Status: DISCONTINUED | OUTPATIENT
Start: 2023-08-07 | End: 2023-08-08

## 2023-08-07 NOTE — PLAN OF CARE
Problem: Patient Centered Care  Goal: Patient preferences are identified and integrated in the patient's plan of care  Description: Interventions:  - What would you like us to know as we care for you? Pt from home with his daughter  - Provide timely, complete, and accurate information to patient/family  - Incorporate patient and family knowledge, values, beliefs, and cultural backgrounds into the planning and delivery of care  - Encourage patient/family to participate in care and decision-making at the level they choose  - Honor patient and family perspectives and choices  Outcome: Progressing     Problem: Patient/Family Goals  Goal: Patient/Family Long Term Goal  Description: Patient's Long Term Goal: resolve UTI    Interventions:  - See additional Care Plan goals for specific interventions  Outcome: Progressing  Goal: Patient/Family Short Term Goal  Description: Patient's Short Term Goal: discharge home    Interventions:   - See additional Care Plan goals for specific interventions  Outcome: Progressing     Problem: CARDIOVASCULAR - ADULT  Goal: Maintains optimal cardiac output and hemodynamic stability  Description: INTERVENTIONS:  - Monitor vital signs, rhythm, and trends  - Monitor for bleeding, hypotension and signs of decreased cardiac output  - Evaluate effectiveness of vasoactive medications to optimize hemodynamic stability  - Monitor arterial and/or venous puncture sites for bleeding and/or hematoma  - Assess quality of pulses, skin color and temperature  - Assess for signs of decreased coronary artery perfusion - ex.  Angina  - Evaluate fluid balance, assess for edema, trend weights  Outcome: Progressing  Goal: Absence of cardiac arrhythmias or at baseline  Description: INTERVENTIONS:  - Continuous cardiac monitoring, monitor vital signs, obtain 12 lead EKG if indicated  - Evaluate effectiveness of antiarrhythmic and heart rate control medications as ordered  - Initiate emergency measures for life threatening arrhythmias  - Monitor electrolytes and administer replacement therapy as ordered  Outcome: Progressing     Problem: METABOLIC/FLUID AND ELECTROLYTES - ADULT  Goal: Glucose maintained within prescribed range  Description: INTERVENTIONS:  - Monitor Blood Glucose as ordered  - Assess for signs and symptoms of hyperglycemia and hypoglycemia  - Administer ordered medications to maintain glucose within target range  - Assess barriers to adequate nutritional intake and initiate nutrition consult as needed  - Instruct patient on self management of diabetes  Outcome: Progressing  Goal: Electrolytes maintained within normal limits  Description: INTERVENTIONS:  - Monitor labs and rhythm and assess patient for signs and symptoms of electrolyte imbalances  - Administer electrolyte replacement as ordered  - Monitor response to electrolyte replacements, including rhythm and repeat lab results as appropriate  - Fluid restriction as ordered  - Instruct patient on fluid and nutrition restrictions as appropriate  Outcome: Progressing  Goal: Hemodynamic stability and optimal renal function maintained  Description: INTERVENTIONS:  - Monitor labs and assess for signs and symptoms of volume excess or deficit  - Monitor intake, output and patient weight  - Monitor urine specific gravity, serum osmolarity and serum sodium as indicated or ordered  - Monitor response to interventions for patient's volume status, including labs, urine output, blood pressure (other measures as available)  - Encourage oral intake as appropriate  - Instruct patient on fluid and nutrition restrictions as appropriate  Outcome: Progressing     Problem: PAIN - ADULT  Goal: Verbalizes/displays adequate comfort level or patient's stated pain goal  Description: INTERVENTIONS:  - Encourage pt to monitor pain and request assistance  - Assess pain using appropriate pain scale  - Administer analgesics based on type and severity of pain and evaluate response  - Implement non-pharmacological measures as appropriate and evaluate response  - Consider cultural and social influences on pain and pain management  - Manage/alleviate anxiety  - Utilize distraction and/or relaxation techniques  - Monitor for opioid side effects  - Notify MD/LIP if interventions unsuccessful or patient reports new pain  - Anticipate increased pain with activity and pre-medicate as appropriate  Outcome: Progressing     Problem: SAFETY ADULT - FALL  Goal: Free from fall injury  Description: INTERVENTIONS:  - Assess pt frequently for physical needs  - Identify cognitive and physical deficits and behaviors that affect risk of falls.   - [place] fall precautions as indicated by assessment.  - Educate pt/family on patient safety including physical limitations  - Instruct pt to call for assistance with activity based on assessment  - Modify environment to reduce risk of injury  - Provide assistive devices as appropriate  - Consider OT/PT consult to assist with strengthening/mobility  - Encourage toileting schedule  Outcome: Progressing     Problem: DISCHARGE PLANNING  Goal: Discharge to home or other facility with appropriate resources  Description: INTERVENTIONS:  - Identify barriers to discharge w/pt and caregiver  - Include patient/family/discharge partner in discharge planning  - Arrange for needed discharge resources and transportation as appropriate  - Identify discharge learning needs (meds, wound care, etc)  - Arrange for interpreters to assist at discharge as needed  - Consider post-discharge preferences of patient/family/discharge partner  - Complete POLST form as appropriate  - Assess patient's ability to be responsible for managing their own health  - Refer to Case Management Department for coordinating discharge planning if the patient needs post-hospital services based on physician/LIP order or complex needs related to functional status, cognitive ability or social support system  Outcome: Progressing

## 2023-08-07 NOTE — PLAN OF CARE
Patient is alert and oriented per baseline. Vital signs stable, febrile and managed with PO meds, and complaints of pain controlled with current plan. Updated on plan of care by this RN at bedside. Remains self-care in the room. Call light visible and within reach. Problem: Patient Centered Care  Goal: Patient preferences are identified and integrated in the patient's plan of care  Description: Interventions:  - What would you like us to know as we care for you? From home with family  - Provide timely, complete, and accurate information to patient/family  - Incorporate patient and family knowledge, values, beliefs, and cultural backgrounds into the planning and delivery of care  - Encourage patient/family to participate in care and decision-making at the level they choose  - Honor patient and family perspectives and choices  Outcome: Progressing     Problem: Patient/Family Goals  Goal: Patient/Family Long Term Goal  Description: Patient's Long Term Goal: UTI free    Interventions:  - hydration, hygiene   - See additional Care Plan goals for specific interventions  Outcome: Progressing  Goal: Patient/Family Short Term Goal  Description: Patient's Short Term Goal: pain control, uti tx    Interventions:   - med management, hydration  - See additional Care Plan goals for specific interventions  Outcome: Progressing     Problem: CARDIOVASCULAR - ADULT  Goal: Maintains optimal cardiac output and hemodynamic stability  Description: INTERVENTIONS:  - Monitor vital signs, rhythm, and trends  - Monitor for bleeding, hypotension and signs of decreased cardiac output  - Evaluate effectiveness of vasoactive medications to optimize hemodynamic stability  - Monitor arterial and/or venous puncture sites for bleeding and/or hematoma  - Assess quality of pulses, skin color and temperature  - Assess for signs of decreased coronary artery perfusion - ex.  Angina  - Evaluate fluid balance, assess for edema, trend weights  Outcome: Progressing  Goal: Absence of cardiac arrhythmias or at baseline  Description: INTERVENTIONS:  - Continuous cardiac monitoring, monitor vital signs, obtain 12 lead EKG if indicated  - Evaluate effectiveness of antiarrhythmic and heart rate control medications as ordered  - Initiate emergency measures for life threatening arrhythmias  - Monitor electrolytes and administer replacement therapy as ordered  Outcome: Progressing     Problem: METABOLIC/FLUID AND ELECTROLYTES - ADULT  Goal: Glucose maintained within prescribed range  Description: INTERVENTIONS:  - Monitor Blood Glucose as ordered  - Assess for signs and symptoms of hyperglycemia and hypoglycemia  - Administer ordered medications to maintain glucose within target range  - Assess barriers to adequate nutritional intake and initiate nutrition consult as needed  - Instruct patient on self management of diabetes  Outcome: Progressing  Goal: Electrolytes maintained within normal limits  Description: INTERVENTIONS:  - Monitor labs and rhythm and assess patient for signs and symptoms of electrolyte imbalances  - Administer electrolyte replacement as ordered  - Monitor response to electrolyte replacements, including rhythm and repeat lab results as appropriate  - Fluid restriction as ordered  - Instruct patient on fluid and nutrition restrictions as appropriate  Outcome: Progressing  Goal: Hemodynamic stability and optimal renal function maintained  Description: INTERVENTIONS:  - Monitor labs and assess for signs and symptoms of volume excess or deficit  - Monitor intake, output and patient weight  - Monitor urine specific gravity, serum osmolarity and serum sodium as indicated or ordered  - Monitor response to interventions for patient's volume status, including labs, urine output, blood pressure (other measures as available)  - Encourage oral intake as appropriate  - Instruct patient on fluid and nutrition restrictions as appropriate  Outcome: Progressing Problem: PAIN - ADULT  Goal: Verbalizes/displays adequate comfort level or patient's stated pain goal  Description: INTERVENTIONS:  - Encourage pt to monitor pain and request assistance  - Assess pain using appropriate pain scale  - Administer analgesics based on type and severity of pain and evaluate response  - Implement non-pharmacological measures as appropriate and evaluate response  - Consider cultural and social influences on pain and pain management  - Manage/alleviate anxiety  - Utilize distraction and/or relaxation techniques  - Monitor for opioid side effects  - Notify MD/LIP if interventions unsuccessful or patient reports new pain  - Anticipate increased pain with activity and pre-medicate as appropriate  Outcome: Progressing     Problem: SAFETY ADULT - FALL  Goal: Free from fall injury  Description: INTERVENTIONS:  - Assess pt frequently for physical needs  - Identify cognitive and physical deficits and behaviors that affect risk of falls.   - [place] fall precautions as indicated by assessment.  - Educate pt/family on patient safety including physical limitations  - Instruct pt to call for assistance with activity based on assessment  - Modify environment to reduce risk of injury  - Provide assistive devices as appropriate  - Consider OT/PT consult to assist with strengthening/mobility  - Encourage toileting schedule  Outcome: Progressing     Problem: DISCHARGE PLANNING  Goal: Discharge to home or other facility with appropriate resources  Description: INTERVENTIONS:  - Identify barriers to discharge w/pt and caregiver  - Include patient/family/discharge partner in discharge planning  - Arrange for needed discharge resources and transportation as appropriate  - Identify discharge learning needs (meds, wound care, etc)  - Arrange for interpreters to assist at discharge as needed  - Consider post-discharge preferences of patient/family/discharge partner  - Complete POLST form as appropriate  - Assess patient's ability to be responsible for managing their own health  - Refer to Case Management Department for coordinating discharge planning if the patient needs post-hospital services based on physician/LIP order or complex needs related to functional status, cognitive ability or social support system  Outcome: Progressing

## 2023-08-07 NOTE — ED INITIAL ASSESSMENT (HPI)
Pt presents to ED for fever of 101.7F since yesterday. Pt recently hospitalized for a kidney infection 2 weeks ago and states his symptoms are similar. Pt c/o flank pain.  Ibuprofen taken around 3pm.

## 2023-08-07 NOTE — ED QUICK NOTES
Orders for admission, patient is aware of plan and ready to go upstairs. Any questions, please call ED RN Ana Laura Palafox at extension [phone].      Patient Covid vaccination status: Partially vaccinated     COVID Test Ordered in ED: Rapid SARS-CoV-2 by PCR    COVID Suspicion at Admission: N/A    Running Infusions:      Mental Status/LOC at time of transport: a/ox3    Other pertinent information:   CIWA score: N/A   NIH score:  N/A

## 2023-08-08 VITALS
OXYGEN SATURATION: 98 % | HEIGHT: 69 IN | SYSTOLIC BLOOD PRESSURE: 109 MMHG | WEIGHT: 228.31 LBS | RESPIRATION RATE: 16 BRPM | DIASTOLIC BLOOD PRESSURE: 66 MMHG | BODY MASS INDEX: 33.82 KG/M2 | HEART RATE: 84 BPM | TEMPERATURE: 100 F

## 2023-08-08 LAB — POTASSIUM SERPL-SCNC: 3.6 MMOL/L (ref 3.5–5.1)

## 2023-08-08 PROCEDURE — 99239 HOSP IP/OBS DSCHRG MGMT >30: CPT | Performed by: HOSPITALIST

## 2023-08-08 RX ORDER — CIPROFLOXACIN 500 MG/1
500 TABLET, FILM COATED ORAL 2 TIMES DAILY
Qty: 20 TABLET | Refills: 0 | Status: SHIPPED | OUTPATIENT
Start: 2023-08-08 | End: 2023-08-18

## 2023-08-08 NOTE — PLAN OF CARE
Patient is alert and oriented per baseline. Afebrile and vital signs stable. No complaints of pain. Discharge education provided. Patient is agreeable to plan of care and ready to go home- self care in personal vehicle. Problem: Patient Centered Care  Goal: Patient preferences are identified and integrated in the patient's plan of care  Description: Interventions:  - What would you like us to know as we care for you? Home with dtr  - Provide timely, complete, and accurate information to patient/family  - Incorporate patient and family knowledge, values, beliefs, and cultural backgrounds into the planning and delivery of care  - Encourage patient/family to participate in care and decision-making at the level they choose  - Honor patient and family perspectives and choices  Outcome: Adequate for Discharge     Problem: Patient/Family Goals  Goal: Patient/Family Long Term Goal  Description: Patient's Long Term Goal: UTI free    Interventions:  - medications, hygiene, fluids  - See additional Care Plan goals for specific interventions  Outcome: Adequate for Discharge  Goal: Patient/Family Short Term Goal  Description: Patient's Short Term Goal: fever free    Interventions:   - meds, hydration  - See additional Care Plan goals for specific interventions  Outcome: Adequate for Discharge     Problem: CARDIOVASCULAR - ADULT  Goal: Maintains optimal cardiac output and hemodynamic stability  Description: INTERVENTIONS:  - Monitor vital signs, rhythm, and trends  - Monitor for bleeding, hypotension and signs of decreased cardiac output  - Evaluate effectiveness of vasoactive medications to optimize hemodynamic stability  - Monitor arterial and/or venous puncture sites for bleeding and/or hematoma  - Assess quality of pulses, skin color and temperature  - Assess for signs of decreased coronary artery perfusion - ex.  Angina  - Evaluate fluid balance, assess for edema, trend weights  Outcome: Adequate for Discharge  Goal: Absence of cardiac arrhythmias or at baseline  Description: INTERVENTIONS:  - Continuous cardiac monitoring, monitor vital signs, obtain 12 lead EKG if indicated  - Evaluate effectiveness of antiarrhythmic and heart rate control medications as ordered  - Initiate emergency measures for life threatening arrhythmias  - Monitor electrolytes and administer replacement therapy as ordered  Outcome: Adequate for Discharge     Problem: METABOLIC/FLUID AND ELECTROLYTES - ADULT  Goal: Glucose maintained within prescribed range  Description: INTERVENTIONS:  - Monitor Blood Glucose as ordered  - Assess for signs and symptoms of hyperglycemia and hypoglycemia  - Administer ordered medications to maintain glucose within target range  - Assess barriers to adequate nutritional intake and initiate nutrition consult as needed  - Instruct patient on self management of diabetes  Outcome: Adequate for Discharge  Goal: Electrolytes maintained within normal limits  Description: INTERVENTIONS:  - Monitor labs and rhythm and assess patient for signs and symptoms of electrolyte imbalances  - Administer electrolyte replacement as ordered  - Monitor response to electrolyte replacements, including rhythm and repeat lab results as appropriate  - Fluid restriction as ordered  - Instruct patient on fluid and nutrition restrictions as appropriate  Outcome: Adequate for Discharge  Goal: Hemodynamic stability and optimal renal function maintained  Description: INTERVENTIONS:  - Monitor labs and assess for signs and symptoms of volume excess or deficit  - Monitor intake, output and patient weight  - Monitor urine specific gravity, serum osmolarity and serum sodium as indicated or ordered  - Monitor response to interventions for patient's volume status, including labs, urine output, blood pressure (other measures as available)  - Encourage oral intake as appropriate  - Instruct patient on fluid and nutrition restrictions as appropriate  Outcome: Adequate for Discharge     Problem: PAIN - ADULT  Goal: Verbalizes/displays adequate comfort level or patient's stated pain goal  Description: INTERVENTIONS:  - Encourage pt to monitor pain and request assistance  - Assess pain using appropriate pain scale  - Administer analgesics based on type and severity of pain and evaluate response  - Implement non-pharmacological measures as appropriate and evaluate response  - Consider cultural and social influences on pain and pain management  - Manage/alleviate anxiety  - Utilize distraction and/or relaxation techniques  - Monitor for opioid side effects  - Notify MD/LIP if interventions unsuccessful or patient reports new pain  - Anticipate increased pain with activity and pre-medicate as appropriate  Outcome: Adequate for Discharge     Problem: SAFETY ADULT - FALL  Goal: Free from fall injury  Description: INTERVENTIONS:  - Assess pt frequently for physical needs  - Identify cognitive and physical deficits and behaviors that affect risk of falls.   - [place] fall precautions as indicated by assessment.  - Educate pt/family on patient safety including physical limitations  - Instruct pt to call for assistance with activity based on assessment  - Modify environment to reduce risk of injury  - Provide assistive devices as appropriate  - Consider OT/PT consult to assist with strengthening/mobility  - Encourage toileting schedule  Outcome: Adequate for Discharge     Problem: DISCHARGE PLANNING  Goal: Discharge to home or other facility with appropriate resources  Description: INTERVENTIONS:  - Identify barriers to discharge w/pt and caregiver  - Include patient/family/discharge partner in discharge planning  - Arrange for needed discharge resources and transportation as appropriate  - Identify discharge learning needs (meds, wound care, etc)  - Arrange for interpreters to assist at discharge as needed  - Consider post-discharge preferences of patient/family/discharge partner  - Complete POLST form as appropriate  - Assess patient's ability to be responsible for managing their own health  - Refer to Case Management Department for coordinating discharge planning if the patient needs post-hospital services based on physician/LIP order or complex needs related to functional status, cognitive ability or social support system  Outcome: Adequate for Discharge

## 2023-08-08 NOTE — PLAN OF CARE
Received Pt from staff RN at 2300  A/Ox4, no complaints of pain  Meds given per [facility]  Adequate UOP  Adequate OP via colostomy, self-managed by patient  No family present throughout shift  VSS, no temps noted, but patient was shivering at 2300, warm blankets applied      Problem: CARDIOVASCULAR - ADULT  Goal: Maintains optimal cardiac output and hemodynamic stability  Description: INTERVENTIONS:  - Monitor vital signs, rhythm, and trends  - Monitor for bleeding, hypotension and signs of decreased cardiac output  - Evaluate effectiveness of vasoactive medications to optimize hemodynamic stability  - Monitor arterial and/or venous puncture sites for bleeding and/or hematoma  - Assess quality of pulses, skin color and temperature  - Assess for signs of decreased coronary artery perfusion - ex. Angina  - Evaluate fluid balance, assess for edema, trend weights  Outcome: Progressing     Problem: PAIN - ADULT  Goal: Verbalizes/displays adequate comfort level or patient's stated pain goal  Description: INTERVENTIONS:  - Encourage pt to monitor pain and request assistance  - Assess pain using appropriate pain scale  - Administer analgesics based on type and severity of pain and evaluate response  - Implement non-pharmacological measures as appropriate and evaluate response  - Consider cultural and social influences on pain and pain management  - Manage/alleviate anxiety  - Utilize distraction and/or relaxation techniques  - Monitor for opioid side effects  - Notify MD/LIP if interventions unsuccessful or patient reports new pain  - Anticipate increased pain with activity and pre-medicate as appropriate  Outcome: Progressing     Problem: SAFETY ADULT - FALL  Goal: Free from fall injury  Description: INTERVENTIONS:  - Assess pt frequently for physical needs  - Identify cognitive and physical deficits and behaviors that affect risk of falls.   - [place] fall precautions as indicated by assessment.  - Educate pt/family on patient safety including physical limitations  - Instruct pt to call for assistance with activity based on assessment  - Modify environment to reduce risk of injury  - Provide assistive devices as appropriate  - Consider OT/PT consult to assist with strengthening/mobility  - Encourage toileting schedule  Outcome: Progressing

## 2023-08-08 NOTE — DISCHARGE INSTRUCTIONS
Please follow up with your urologist within 1 week of discharge. Please follow up with your primary care doctor in 1 week of discharge.

## 2023-08-08 NOTE — CM/SW NOTE
08/08/23 1000   Discharge disposition   Expected discharge disposition Home or Self   Discharge transportation Private car     Pt received MDO for DC. Pt is self, NN.     SW/CM to remain available for support and/or discharge planning.      Елена Rutherford MSW, LSW   x [phone]

## 2023-08-09 ENCOUNTER — PATIENT OUTREACH (OUTPATIENT)
Dept: CASE MANAGEMENT | Age: 28
End: 2023-08-09

## 2023-08-09 NOTE — PROGRESS NOTES
TCM chart review. No TCM as patient follows with outside [facility] PCP. Encounter closing.

## 2023-12-27 ENCOUNTER — HOSPITAL ENCOUNTER (EMERGENCY)
Facility: HOSPITAL | Age: 28
Discharge: HOME | End: 2023-12-27
Attending: STUDENT IN AN ORGANIZED HEALTH CARE EDUCATION/TRAINING PROGRAM
Payer: MEDICAID

## 2023-12-27 VITALS
BODY MASS INDEX: 32.21 KG/M2 | DIASTOLIC BLOOD PRESSURE: 86 MMHG | WEIGHT: 225 LBS | HEART RATE: 67 BPM | HEIGHT: 70 IN | SYSTOLIC BLOOD PRESSURE: 131 MMHG | OXYGEN SATURATION: 97 % | RESPIRATION RATE: 18 BRPM | TEMPERATURE: 98 F

## 2023-12-27 DIAGNOSIS — L72.3 INFECTED SEBACEOUS CYST OF SKIN: Primary | ICD-10-CM

## 2023-12-27 DIAGNOSIS — L08.9 INFECTED SEBACEOUS CYST OF SKIN: Primary | ICD-10-CM

## 2023-12-27 PROCEDURE — 10060 I&D ABSCESS SIMPLE/SINGLE: CPT

## 2023-12-27 PROCEDURE — 99284 EMERGENCY DEPT VISIT MOD MDM: CPT

## 2023-12-27 PROCEDURE — 99283 EMERGENCY DEPT VISIT LOW MDM: CPT

## 2023-12-27 RX ORDER — CLINDAMYCIN HYDROCHLORIDE 300 MG/1
300 CAPSULE ORAL 3 TIMES DAILY
Qty: 15 CAPSULE | Refills: 0 | Status: SHIPPED | OUTPATIENT
Start: 2023-12-27 | End: 2024-01-01

## 2023-12-27 RX ORDER — LIDOCAINE HYDROCHLORIDE 10 MG/ML
20 INJECTION, SOLUTION EPIDURAL; INFILTRATION; INTRACAUDAL; PERINEURAL ONCE
Status: COMPLETED | OUTPATIENT
Start: 2023-12-27 | End: 2023-12-27

## 2023-12-27 RX ORDER — FLUOXETINE HYDROCHLORIDE 40 MG/1
40 CAPSULE ORAL DAILY
COMMUNITY

## 2023-12-27 RX ORDER — LIDOCAINE HYDROCHLORIDE 10 MG/ML
INJECTION, SOLUTION EPIDURAL; INFILTRATION; INTRACAUDAL; PERINEURAL
Status: DISCONTINUED
Start: 2023-12-27 | End: 2023-12-27 | Stop reason: WASHOUT

## 2023-12-27 NOTE — ED INITIAL ASSESSMENT (HPI)
Pt ambulatory through triage c/o boil on rear neck. Started 3-4 days ago and is painful. Pt states he hsa had them in the past and normally go away on their own. This one is much more painful and getting worse. No meds taken for pain today.

## 2024-06-29 ENCOUNTER — APPOINTMENT (OUTPATIENT)
Dept: GENERAL RADIOLOGY | Facility: HOSPITAL | Age: 29
End: 2024-06-29
Attending: NURSE PRACTITIONER
Payer: MEDICAID

## 2024-06-29 ENCOUNTER — HOSPITAL ENCOUNTER (INPATIENT)
Facility: HOSPITAL | Age: 29
LOS: 2 days | Discharge: HOME | End: 2024-07-01
Attending: INTERNAL MEDICINE | Admitting: INTERNAL MEDICINE
Payer: MEDICAID

## 2024-06-29 ENCOUNTER — APPOINTMENT (OUTPATIENT)
Dept: CT IMAGING | Facility: HOSPITAL | Age: 29
End: 2024-06-29
Attending: NURSE PRACTITIONER
Payer: MEDICAID

## 2024-06-29 DIAGNOSIS — N39.0 URINARY TRACT INFECTION ASSOCIATED WITH NEPHROSTOMY CATHETER, INITIAL ENCOUNTER (HCC): Primary | ICD-10-CM

## 2024-06-29 DIAGNOSIS — T83.512A URINARY TRACT INFECTION ASSOCIATED WITH NEPHROSTOMY CATHETER, INITIAL ENCOUNTER (HCC): Primary | ICD-10-CM

## 2024-06-29 LAB
ALBUMIN SERPL-MCNC: 5.1 G/DL (ref 3.2–4.8)
ALBUMIN/GLOB SERPL: 1.1 {RATIO} (ref 1–2)
ALP LIVER SERPL-CCNC: 89 U/L
ALT SERPL-CCNC: 14 U/L
ANION GAP SERPL CALC-SCNC: 9 MMOL/L (ref 0–18)
AST SERPL-CCNC: 24 U/L (ref ?–34)
BASOPHILS # BLD AUTO: 0.05 X10(3) UL (ref 0–0.2)
BASOPHILS NFR BLD AUTO: 0.6 %
BILIRUB SERPL-MCNC: 1.6 MG/DL (ref 0.3–1.2)
BILIRUB UR QL: NEGATIVE
BUN BLD-MCNC: 11 MG/DL (ref 9–23)
BUN/CREAT SERPL: 11.1 (ref 10–20)
CALCIUM BLD-MCNC: 9.5 MG/DL (ref 8.7–10.4)
CHLORIDE SERPL-SCNC: 105 MMOL/L (ref 98–112)
CLARITY UR: CLEAR
CO2 SERPL-SCNC: 26 MMOL/L (ref 21–32)
COLOR UR: YELLOW
CREAT BLD-MCNC: 0.99 MG/DL
DEPRECATED RDW RBC AUTO: 40.9 FL (ref 35.1–46.3)
EGFRCR SERPLBLD CKD-EPI 2021: 105 ML/MIN/1.73M2 (ref 60–?)
EOSINOPHIL # BLD AUTO: 0.02 X10(3) UL (ref 0–0.7)
EOSINOPHIL NFR BLD AUTO: 0.2 %
ERYTHROCYTE [DISTWIDTH] IN BLOOD BY AUTOMATED COUNT: 13.2 % (ref 11–15)
GLOBULIN PLAS-MCNC: 4.6 G/DL (ref 2–3.5)
GLUCOSE BLD-MCNC: 77 MG/DL (ref 70–99)
GLUCOSE UR-MCNC: NORMAL MG/DL
HCT VFR BLD AUTO: 45.2 %
HGB BLD-MCNC: 15.7 G/DL
IMM GRANULOCYTES # BLD AUTO: 0.06 X10(3) UL (ref 0–1)
IMM GRANULOCYTES NFR BLD: 0.7 %
KETONES UR-MCNC: NEGATIVE MG/DL
LACTATE SERPL-SCNC: 1.8 MMOL/L (ref 0.5–2)
LEUKOCYTE ESTERASE UR QL STRIP.AUTO: 500
LYMPHOCYTES # BLD AUTO: 1.15 X10(3) UL (ref 1–4)
LYMPHOCYTES NFR BLD AUTO: 13.6 %
MCH RBC QN AUTO: 29.2 PG (ref 26–34)
MCHC RBC AUTO-ENTMCNC: 34.7 G/DL (ref 31–37)
MCV RBC AUTO: 84 FL
MONOCYTES # BLD AUTO: 0.49 X10(3) UL (ref 0.1–1)
MONOCYTES NFR BLD AUTO: 5.8 %
NEUTROPHILS # BLD AUTO: 6.66 X10 (3) UL (ref 1.5–7.7)
NEUTROPHILS # BLD AUTO: 6.66 X10(3) UL (ref 1.5–7.7)
NEUTROPHILS NFR BLD AUTO: 79.1 %
OSMOLALITY SERPL CALC.SUM OF ELEC: 288 MOSM/KG (ref 275–295)
PH UR: 6 [PH] (ref 5–8)
PLATELET # BLD AUTO: 211 10(3)UL (ref 150–450)
POTASSIUM SERPL-SCNC: 3.3 MMOL/L (ref 3.5–5.1)
PROT SERPL-MCNC: 9.7 G/DL (ref 5.7–8.2)
PROT UR-MCNC: 30 MG/DL
RBC # BLD AUTO: 5.38 X10(6)UL
RBC #/AREA URNS AUTO: >10 /HPF
SODIUM SERPL-SCNC: 140 MMOL/L (ref 136–145)
SP GR UR STRIP: 1.02 (ref 1–1.03)
UROBILINOGEN UR STRIP-ACNC: NORMAL
WBC # BLD AUTO: 8.4 X10(3) UL (ref 4–11)
WBC #/AREA URNS AUTO: >50 /HPF

## 2024-06-29 PROCEDURE — 99285 EMERGENCY DEPT VISIT HI MDM: CPT

## 2024-06-29 PROCEDURE — 87086 URINE CULTURE/COLONY COUNT: CPT | Performed by: NURSE PRACTITIONER

## 2024-06-29 PROCEDURE — 85025 COMPLETE CBC W/AUTO DIFF WBC: CPT | Performed by: NURSE PRACTITIONER

## 2024-06-29 PROCEDURE — 81001 URINALYSIS AUTO W/SCOPE: CPT | Performed by: NURSE PRACTITIONER

## 2024-06-29 PROCEDURE — 96361 HYDRATE IV INFUSION ADD-ON: CPT

## 2024-06-29 PROCEDURE — 87040 BLOOD CULTURE FOR BACTERIA: CPT | Performed by: NURSE PRACTITIONER

## 2024-06-29 PROCEDURE — 96374 THER/PROPH/DIAG INJ IV PUSH: CPT

## 2024-06-29 PROCEDURE — 74177 CT ABD & PELVIS W/CONTRAST: CPT | Performed by: NURSE PRACTITIONER

## 2024-06-29 PROCEDURE — 83605 ASSAY OF LACTIC ACID: CPT | Performed by: NURSE PRACTITIONER

## 2024-06-29 PROCEDURE — 71045 X-RAY EXAM CHEST 1 VIEW: CPT | Performed by: NURSE PRACTITIONER

## 2024-06-29 PROCEDURE — 87077 CULTURE AEROBIC IDENTIFY: CPT | Performed by: NURSE PRACTITIONER

## 2024-06-29 PROCEDURE — 36415 COLL VENOUS BLD VENIPUNCTURE: CPT

## 2024-06-29 PROCEDURE — 80053 COMPREHEN METABOLIC PANEL: CPT | Performed by: NURSE PRACTITIONER

## 2024-06-29 RX ORDER — ONDANSETRON 2 MG/ML
4 INJECTION INTRAMUSCULAR; INTRAVENOUS EVERY 6 HOURS PRN
Status: DISCONTINUED | OUTPATIENT
Start: 2024-06-29 | End: 2024-07-02

## 2024-06-29 RX ORDER — METOCLOPRAMIDE 5 MG/1
10 TABLET ORAL EVERY 6 HOURS PRN
Status: DISCONTINUED | OUTPATIENT
Start: 2024-06-29 | End: 2024-07-02

## 2024-06-29 RX ORDER — SODIUM CHLORIDE 9 MG/ML
INJECTION, SOLUTION INTRAVENOUS CONTINUOUS
Status: ACTIVE | OUTPATIENT
Start: 2024-06-29 | End: 2024-06-30

## 2024-06-29 RX ORDER — VANCOMYCIN 1.75 GRAM/500 ML IN 0.9 % SODIUM CHLORIDE INTRAVENOUS
15 ONCE
Status: COMPLETED | OUTPATIENT
Start: 2024-06-29 | End: 2024-06-30

## 2024-06-29 RX ORDER — ACETAMINOPHEN 500 MG
1000 TABLET ORAL EVERY 6 HOURS PRN
Status: DISCONTINUED | OUTPATIENT
Start: 2024-06-29 | End: 2024-07-02

## 2024-06-29 RX ORDER — HEPARIN SODIUM 5000 [USP'U]/ML
5000 INJECTION, SOLUTION INTRAVENOUS; SUBCUTANEOUS EVERY 8 HOURS SCHEDULED
Status: DISCONTINUED | OUTPATIENT
Start: 2024-06-30 | End: 2024-07-02

## 2024-06-29 RX ORDER — FLUOXETINE HYDROCHLORIDE 20 MG/1
40 CAPSULE ORAL DAILY
Status: DISCONTINUED | OUTPATIENT
Start: 2024-06-30 | End: 2024-07-02

## 2024-06-29 RX ORDER — MORPHINE SULFATE 2 MG/ML
2 INJECTION, SOLUTION INTRAMUSCULAR; INTRAVENOUS EVERY 2 HOUR PRN
Status: DISCONTINUED | OUTPATIENT
Start: 2024-06-29 | End: 2024-07-02

## 2024-06-29 RX ORDER — PANTOPRAZOLE SODIUM 40 MG/1
40 TABLET, DELAYED RELEASE ORAL
Status: DISCONTINUED | OUTPATIENT
Start: 2024-06-30 | End: 2024-07-02

## 2024-06-29 RX ORDER — MORPHINE SULFATE 2 MG/ML
1 INJECTION, SOLUTION INTRAMUSCULAR; INTRAVENOUS EVERY 2 HOUR PRN
Status: DISCONTINUED | OUTPATIENT
Start: 2024-06-29 | End: 2024-07-02

## 2024-06-29 RX ORDER — SODIUM CHLORIDE 9 MG/ML
INJECTION, SOLUTION INTRAVENOUS CONTINUOUS
Status: DISCONTINUED | OUTPATIENT
Start: 2024-06-30 | End: 2024-07-02

## 2024-06-29 RX ORDER — ZOLPIDEM TARTRATE 5 MG/1
10 TABLET ORAL NIGHTLY PRN
Status: DISCONTINUED | OUTPATIENT
Start: 2024-06-29 | End: 2024-07-02

## 2024-06-29 RX ORDER — MORPHINE SULFATE 4 MG/ML
4 INJECTION, SOLUTION INTRAMUSCULAR; INTRAVENOUS EVERY 2 HOUR PRN
Status: DISCONTINUED | OUTPATIENT
Start: 2024-06-29 | End: 2024-07-02

## 2024-06-29 RX ORDER — ACETAMINOPHEN 500 MG
500 TABLET ORAL EVERY 6 HOURS PRN
Status: DISCONTINUED | OUTPATIENT
Start: 2024-06-29 | End: 2024-07-02

## 2024-06-29 RX ORDER — IBUPROFEN 600 MG/1
600 TABLET ORAL ONCE
Status: COMPLETED | OUTPATIENT
Start: 2024-06-29 | End: 2024-06-29

## 2024-06-29 NOTE — ED PROVIDER NOTES
Patient Seen in: [facility] Emergency Department      History     Chief Complaint   Patient presents with    Fever     Stated Complaint: fever    Subjective:   30yo/m w hx of colonic resection after multiple episodes of diverticulitis, colovesicular fistula s/p sigmoidectomy in 2023, left ureteral stricture with stenting. No vomiting. Minimal cough. No chest pain. No flank pain. No urinary symptoms. No head, neck, back pain. No dysuria. Reports multiple recent episodes of the same with observation admissions and unclear etiology of fever. NO vomiting or diarrhea. No headaches. No trouble breathing. No sore throat. No rashes.             Objective:   Past Medical History:    Anxiety    Depression    Diverticulitis    Hydronephrosis    Pneumonia due to organism    covid-19 pneaumonia January 2021              History reviewed. No pertinent surgical history.             Social History     Socioeconomic History    Marital status: Single   Tobacco Use    Smoking status: Never    Smokeless tobacco: Never   Vaping Use    Vaping status: Never Used   Substance and Sexual Activity    Alcohol use: Not Currently    Drug use: Never     Social Determinants of Health     Financial Resource Strain: At Risk (4/11/2023)    Received from [facility]     Financial Resource Strain     Financial Resource Strain: 2   Food Insecurity: Low Risk  (6/1/2024)    Received from [facility] Security     Within the past 12 months, the food you bought just didn't last and you didn't have money to get more.: 3     Within the past 12 months, you worried that your food would run out before you got money to buy more.: 3   Transportation Needs: Not At Risk (6/1/2024)    Received from [facility] Transportation Needs     In the past 12 months, has lack of reliable transportation kept you from medical appointments, meetings, work or from getting things needed for daily living?: No   Physical Activity: Inactive (5/28/2024)     Received from [employer]    Physical Activity     On average, how many days per week do you engage in moderate to strenuous exercise (like a brisk walk)?: 0 days     On average, how many minutes do you engage in exercise at this level?: 0 min   Stress: At Risk (4/11/2023)    Received from [facility]     Stress     Stress: 2   Social Connections: Not on File (1/9/2023)    Received from [facility]     Social Connections     Social Connections and Isolation: 0   Housing Stability: Not At Risk (6/1/2024)    [facility]     What is your living situation today?: I have a steady place to live     Think about the place you live. Do you have problems with any of the following?: None of the above              Review of Systems   All other systems reviewed and are negative.      Positive for stated Chief Complaint: Fever    Other systems are as noted in HPI.  Constitutional and vital signs reviewed.      All other systems reviewed and negative except as noted above.    Physical Exam     ED Triage Vitals [06/29/24 1822]   /88   Pulse (!) 122   Resp 20   Temp (!) 100.9 °F (38.3 °C)   Temp src Oral   SpO2 100 %   O2 Device None (Room air)       Current Vitals:   Vital Signs  BP: 107/62  Pulse: 106  Resp: (!) 27  Temp: (!) 100.9 °F (38.3 °C)  Temp src: Oral  MAP (mmHg): 76    Oxygen Therapy  SpO2: 97 %  O2 Device: None (Room air)            Physical Exam  Vitals and nursing note reviewed.   Constitutional:       General: He is not in acute distress.     Appearance: He is well-developed.   HENT:      Head: Normocephalic and atraumatic.      Nose: Nose normal.      Mouth/Throat:      Mouth: Mucous membranes are moist.   Eyes:      Conjunctiva/sclera: Conjunctivae normal.      Pupils: Pupils are equal, round, and reactive to light.   Cardiovascular:      Rate and Rhythm: Normal rate and regular rhythm.      Heart sounds: Normal heart sounds.   Pulmonary:      Effort: Pulmonary effort is  normal.      Breath sounds: Normal breath sounds.   Abdominal:      General: Bowel sounds are normal.      Palpations: Abdomen is soft.   Musculoskeletal:         General: No tenderness or deformity. Normal range of motion.      Cervical back: Normal range of motion and neck supple.   Skin:     General: Skin is warm and dry.      Capillary Refill: Capillary refill takes less than 2 seconds.      Findings: No rash.      Comments: Normal color   Neurological:      General: No focal deficit present.      Mental Status: He is alert and oriented to person, place, and time.      GCS: GCS eye subscore is 4. GCS verbal subscore is 5. GCS motor subscore is 6.      Cranial Nerves: No cranial nerve deficit.      Gait: Gait normal.               ED Course     Labs Reviewed   COMP METABOLIC PANEL (14) - Abnormal; Notable for the following components:       Result Value    Potassium 3.3 (*)     Bilirubin, Total 1.6 (*)     Total Protein 9.7 (*)     Albumin 5.1 (*)     Globulin  4.6 (*)     All other components within normal limits   URINALYSIS WITH CULTURE REFLEX - Abnormal; Notable for the following components:    Blood Urine 1+ (*)     Protein Urine 30 (*)     Nitrite Urine 1+ (*)     Leukocyte Esterase Urine 500 (*)     WBC Urine >50 (*)     RBC Urine >10 (*)     Squamous Epi. Cells Few (*)     All other components within normal limits   LACTIC ACID, PLASMA - Normal   CBC WITH DIFFERENTIAL WITH PLATELET    Narrative:     The following orders were created for panel order CBC With Differential With Platelet.  Procedure                               Abnormality         Status                     ---------                               -----------         ------                     CBC W/ DIFFERENTIAL[[id number]]                              Final result                 Please view results for these tests on the individual orders.   BLOOD CULTURE   BLOOD CULTURE   URINE CULTURE, ROUTINE   CBC W/ DIFFERENTIAL          ED Course as  of 06/29/24 2115  ------------------------------------------------------------  Time: 06/29 2107  Comment: Spoke with [facility] will consult  ------------------------------------------------------------  Time: 06/29 2111  Comment: Spoke with Dr. Pereira from [facility], virginie and zosyn           Impression  CONCLUSION:  1.  Linear opacities in both lung bases suggesting atelectasis or scar.  2.  Left ureteral stent is redemonstrated in stable position.  The proximal coil is in the left renal pelvis and the distal coil is in the urinary bladder.  There is mild hydronephrosis in the left kidney extending to the UPJ.  The degree of  hydronephrosis is slightly worse compared 8/6/23 CT.  Additionally, anterior to the left UPJ there is a new soft tissue focus measuring 12 x 12 mm possibly representing a lymph node or urothelial lesion.  Recommend Urology follow-up.  3.  Post sigmoid colon resection with reanastomosis.  No obstruction or inflammation at the surgical site.  4.  Linear tract of scar within the left lower quadrant likely related to prior colostomy or surgical access site.  There is a defect in their right mid abdominal wall related to prior colostomy.  There is herniation of a short segment of small bowel  through the defect without obstruction or inflammation.     Dictated by (CST): William Harrison MD on 6/29/2024 at 8:37 PM      Finalized by (CST): William Harrison MD on 6/29/2024 at 8:44 PM      MDM        Admission disposition: 6/29/2024  9:12 PM                     Medical Decision Making  28yo/m w hx and exam as stated; fever    Stent placed 2 months ago  Normal lactic. Bp, white count  Cxr ?bronchitis  Ua concerning for uti  Stable  No vomiting    Plan  Cultures  Discussed w  [facility]; vanc, zosyn  Discussed w [facility] will consult  Discussed with Cece who will admit      Amount and/or Complexity of Data Reviewed  Labs:  Decision-making details documented in ED Course.  Radiology:  Decision-making details  documented in ED Course.    Risk  OTC drugs.  Prescription drug management.        Disposition and Plan     Clinical Impression:  1. Urinary tract infection associated with nephrostomy catheter, initial encounter ([facility])         Disposition:  Admit  6/29/2024  9:12 pm    Follow-up:  No follow-up provider specified.        Medications Prescribed:  Current Discharge Medication List                            Hospital Problems       Present on Admission  Date Reviewed: 1/8/2023            ICD-10-CM Noted POA    * (Principal) Urinary tract infection associated with nephrostomy catheter, initial encounter ([facility]) T83.512A, N39.0 6/29/2024 Unknown

## 2024-06-29 NOTE — ED INITIAL ASSESSMENT (HPI)
Pt ambulatory to ED A&O x 4 w/ c/o fever of 103° last night.  Pt also reporting temp today of 101°.  Last Tylenol approx 2 hours ago.  Motrin at 0930.  Pt w/ hx of hydronephrosis w/ multiple kidney, bladder, and colon surgeries.  Pt denies any n/v/d/c, dysuria, or abdominal pain.

## 2024-06-30 LAB
ALBUMIN SERPL-MCNC: 3.8 G/DL (ref 3.2–4.8)
ALBUMIN/GLOB SERPL: 1.1 {RATIO} (ref 1–2)
ALP LIVER SERPL-CCNC: 62 U/L
ALT SERPL-CCNC: 12 U/L
ANION GAP SERPL CALC-SCNC: 6 MMOL/L (ref 0–18)
AST SERPL-CCNC: 21 U/L (ref ?–34)
BILIRUB SERPL-MCNC: 0.8 MG/DL (ref 0.3–1.2)
BUN BLD-MCNC: 8 MG/DL (ref 9–23)
BUN/CREAT SERPL: 9.9 (ref 10–20)
C DIFF TOX B STL QL: NEGATIVE
CALCIUM BLD-MCNC: 8.3 MG/DL (ref 8.7–10.4)
CHLORIDE SERPL-SCNC: 110 MMOL/L (ref 98–112)
CO2 SERPL-SCNC: 23 MMOL/L (ref 21–32)
CREAT BLD-MCNC: 0.81 MG/DL
DEPRECATED RDW RBC AUTO: 40.9 FL (ref 35.1–46.3)
EGFRCR SERPLBLD CKD-EPI 2021: 122 ML/MIN/1.73M2 (ref 60–?)
ERYTHROCYTE [DISTWIDTH] IN BLOOD BY AUTOMATED COUNT: 13.2 % (ref 11–15)
GLOBULIN PLAS-MCNC: 3.5 G/DL (ref 2–3.5)
GLUCOSE BLD-MCNC: 100 MG/DL (ref 70–99)
HCT VFR BLD AUTO: 35.9 %
HGB BLD-MCNC: 12.1 G/DL
MAGNESIUM SERPL-MCNC: 1.6 MG/DL (ref 1.6–2.6)
MCH RBC QN AUTO: 28.9 PG (ref 26–34)
MCHC RBC AUTO-ENTMCNC: 33.7 G/DL (ref 31–37)
MCV RBC AUTO: 85.7 FL
OSMOLALITY SERPL CALC.SUM OF ELEC: 286 MOSM/KG (ref 275–295)
PHOSPHATE SERPL-MCNC: 2 MG/DL (ref 2.4–5.1)
PLATELET # BLD AUTO: 218 10(3)UL (ref 150–450)
POTASSIUM SERPL-SCNC: 3.7 MMOL/L (ref 3.5–5.1)
POTASSIUM SERPL-SCNC: 3.7 MMOL/L (ref 3.5–5.1)
PROT SERPL-MCNC: 7.3 G/DL (ref 5.7–8.2)
RBC # BLD AUTO: 4.19 X10(6)UL
SODIUM SERPL-SCNC: 139 MMOL/L (ref 136–145)
WBC # BLD AUTO: 7.9 X10(3) UL (ref 4–11)

## 2024-06-30 PROCEDURE — C9113 INJ PANTOPRAZOLE SODIUM, VIA: HCPCS | Performed by: INTERNAL MEDICINE

## 2024-06-30 PROCEDURE — 87493 C DIFF AMPLIFIED PROBE: CPT | Performed by: INTERNAL MEDICINE

## 2024-06-30 PROCEDURE — 83735 ASSAY OF MAGNESIUM: CPT | Performed by: INTERNAL MEDICINE

## 2024-06-30 PROCEDURE — 84100 ASSAY OF PHOSPHORUS: CPT | Performed by: INTERNAL MEDICINE

## 2024-06-30 PROCEDURE — 84132 ASSAY OF SERUM POTASSIUM: CPT | Performed by: INTERNAL MEDICINE

## 2024-06-30 PROCEDURE — 80053 COMPREHEN METABOLIC PANEL: CPT | Performed by: INTERNAL MEDICINE

## 2024-06-30 PROCEDURE — 85027 COMPLETE CBC AUTOMATED: CPT | Performed by: INTERNAL MEDICINE

## 2024-06-30 PROCEDURE — 87040 BLOOD CULTURE FOR BACTERIA: CPT | Performed by: INTERNAL MEDICINE

## 2024-06-30 RX ORDER — MAGNESIUM OXIDE 400 MG/1
400 TABLET ORAL ONCE
Status: COMPLETED | OUTPATIENT
Start: 2024-06-30 | End: 2024-06-30

## 2024-06-30 RX ORDER — POTASSIUM CHLORIDE 20 MEQ/1
40 TABLET, EXTENDED RELEASE ORAL EVERY 4 HOURS
Status: COMPLETED | OUTPATIENT
Start: 2024-06-30 | End: 2024-06-30

## 2024-06-30 NOTE — H&P
[facility]  part of [facility]    Report of Admission    Timi Hardin Patient Status:  Inpatient    1995 MRN [id number]   Location [facility] 5SW/SE Attending Dev Damon MD   Hosp Day # 0 PCP None Pcp     Date of Admission:  2024  Date of Consult:  2024    Reason for Admission:   Pyelonephritis of the left kidney.  Complicated UTI.    History of Present Illness:   Patient is a 29 year old male who was admitted to the hospital for Urinary tract infection associated with nephrostomy catheter, initial encounter ([facility]):  Mr. Hardin is a very pleasant 29-year-old gentleman who has history of 2 years of ongoing urinary tract problems since he had a complicated episode of diverticulitis with associated involvement of his left ureteropelvic junction.  Patient had a stent placed in the left ureter and also reconstruction of the ureteropelvic junction 2 years ago.  This time patient presented with abdominal pain in the left-sided pain which happened suddenly over 1 to 2 days.  Patient denies any emesis but does report nausea patient also has no GI complaints of diarrhea or constipation.  Patient did report fever with chills.    Past Medical History  Past Medical History:    Anxiety    Depression    Diverticulitis    Hydronephrosis    Pneumonia due to organism    covid-19 pneaumonia 2021       Past Surgical History  History reviewed. No pertinent surgical history.    Family History  No family history on file.    Social History  Pediatric History   Patient Parents    Not on file     Other Topics Concern    Not on file   Social History Narrative    Not on file           Current Medications:    Current Facility-Administered Medications:     acetaminophen (Tylenol Extra Strength) tab 500 mg, 500 mg, Oral, Q6H PRN    sodium chloride 0.9 % IV bolus 1,000 mL, 1,000 mL, Intravenous, Once    sodium chloride 0.9% infusion, , Intravenous, Continuous     piperacillin-tazobactam (Zosyn) 4.5 g in dextrose 5% 100 mL IVPB-ADDV, 4.5 g, Intravenous, Once    vancomycin (Vancocin) 1.75 g in sodium chloride 0.9% 500mL IVPB premix, 15 mg/kg, Intravenous, Once    [START ON 6/30/2024] FLUoxetine (PROzac) cap 40 mg, 40 mg, Oral, Daily    zolpidem (Ambien) tab 10 mg, 10 mg, Oral, Nightly PRN    morphINE PF 2 MG/ML injection 1 mg, 1 mg, Intravenous, Q2H PRN **OR** morphINE PF 2 MG/ML injection 2 mg, 2 mg, Intravenous, Q2H PRN **OR** morphINE PF 4 MG/ML injection 4 mg, 4 mg, Intravenous, Q2H PRN    [START ON 6/30/2024] heparin (Porcine) 5000 UNIT/ML injection 5,000 Units, 5,000 Units, Subcutaneous, Q8H HUMBERTO    acetaminophen (Tylenol Extra Strength) tab 1,000 mg, 1,000 mg, Oral, Q6H PRN    ondansetron (Zofran) 4 MG/2ML injection 4 mg, 4 mg, Intravenous, Q6H PRN    metoclopramide (Reglan) tab 10 mg, 10 mg, Oral, Q6H PRN    [START ON 6/30/2024] pantoprazole (Protonix) 40 mg in sodium chloride 0.9% PF 10 mL IV push, 40 mg, Intravenous, QAM AC **OR** [START ON 6/30/2024] pantoprazole (Protonix) DR tab 40 mg, 40 mg, Oral, QAM AC    [START ON 6/30/2024] sodium chloride 0.9% infusion, , Intravenous, Continuous  Medications Prior to Admission   Medication Sig    Zolpidem Tartrate (AMBIEN OR) Take 10 mg by mouth nightly as needed.    FLUoxetine HCl 40 MG Oral Cap Take 1 capsule (40 mg total) by mouth daily.    acetaminophen 500 MG Oral Tab Take 1 tablet (500 mg total) by mouth every 4 (four) hours as needed for Fever (equal to or greater than 100.4).    docusate sodium 100 MG Oral Cap Take 100 mg by mouth 2 (two) times daily as needed for constipation. (Patient not taking: Reported on 12/27/2023)    phenazopyridine 200 MG Oral Tab Take 1 tablet (200 mg total) by mouth 3 (three) times daily as needed. Watch for orange urine/sweat/tears which may stain (Patient not taking: Reported on 12/27/2023)    sennosides 17.2 MG Oral Tab Take 1 tablet (17.2 mg total) by mouth nightly. (Patient not taking:  Reported on 12/27/2023)    ALPRAZolam 0.5 MG Oral Tab Take 2 tablets (1 mg total) by mouth 2 (two) times daily as needed for Anxiety.    mirtazapine 30 MG Oral Tab Take 1 tablet (30 mg total) by mouth nightly. (Patient not taking: Reported on 12/27/2023)       Allergies  No Known Allergies    Review of Systems:    A comprehensive review of systems was negative except as per HPI    Physical Exam:     Vitals:    06/29/24 2240   BP: 111/70   Pulse: 97   Resp: 16   Temp: (!) 100.8 °F (38.2 °C)     No intake or output data in the 24 hours ending 06/29/24 2353  Wt Readings from Last 2 Encounters:   06/29/24 243 lb 11.2 oz (110.5 kg)   12/27/23 225 lb (102.1 kg)       General appearance:  alert, appears stated age, and cooperative  Head: Normocephalic, without obvious abnormality, atraumatic  Eyes: conjunctivae/corneas clear. PERRL, EOM's intact. Fundi benign.  Ears: normal TM's and external ear canals both ears  Nose: Nares normal. Septum midline. Mucosa normal. No drainage or sinus tenderness.  Throat: lips, mucosa, and tongue normal; teeth and gums normal  Neck: no adenopathy, no carotid bruit, no JVD, supple, symmetrical, trachea midline, and thyroid not enlarged, symmetric, no tenderness/mass/nodules  Pulmonary: clear to auscultation bilaterally  Cardiovascular: S1, S2 normal, no murmur, click, rub or gallop, regular rate and rhythm  Abdominal: soft, non-tender; bowel sounds normal; no masses,  no organomegaly  Extremities: extremities normal, atraumatic, no cyanosis or edema  Pulses: 2+ and symmetric  Skin: Skin color, texture, turgor normal. No rashes or lesions  Neurologic: Grossly normal  Genital Exam: defer exam    Results:     Laboratory Data:  Lab Results   Component Value Date    WBC 8.4 06/29/2024    HGB 15.7 06/29/2024    HCT 45.2 06/29/2024    .0 06/29/2024     06/29/2024    K 3.3 (L) 06/29/2024     06/29/2024    CO2 26.0 06/29/2024    CREATSERUM 0.99 06/29/2024    BUN 11 06/29/2024     GLU 77 06/29/2024    CA 9.5 06/29/2024    ALB 5.1 (H) 06/29/2024    ALKPHO 89 06/29/2024    BILT 1.6 (H) 06/29/2024    TP 9.7 (H) 06/29/2024    AST 24 06/29/2024    ALT 14 06/29/2024    LIP 18 07/19/2023    MG 1.7 07/21/2023    PHOS 2.7 07/21/2023     No results found for this visit on 06/29/24.    Imaging:  CT ABDOMEN+PELVIS(CONTRAST ONLY)(CPT=74177)    Result Date: 6/29/2024  CONCLUSION:  1.  Linear opacities in both lung bases suggesting atelectasis or scar. 2.  Left ureteral stent is redemonstrated in stable position.  The proximal coil is in the left renal pelvis and the distal coil is in the urinary bladder.  There is mild hydronephrosis in the left kidney extending to the UPJ.  The degree of hydronephrosis is slightly worse compared 8/6/23 CT.  Additionally, anterior to the left UPJ there is a new soft tissue focus measuring 12 x 12 mm possibly representing a lymph node or urothelial lesion.  Recommend Urology follow-up. 3.  Post sigmoid colon resection with reanastomosis.  No obstruction or inflammation at the surgical site. 4.  Linear tract of scar within the left lower quadrant likely related to prior colostomy or surgical access site.  There is a defect in their right mid abdominal wall related to prior colostomy.  There is herniation of a short segment of small bowel through the defect without obstruction or inflammation.  Dictated by (CST): William Harrison MD on 6/29/2024 at 8:37 PM     Finalized by (CST): William Harrison MD on 6/29/2024 at 8:44 PM          XR CHEST AP PORTABLE  (CPT=71045)    Result Date: 6/29/2024  CONCLUSION: Centralized airway inflammation suggesting bronchitis or asthma.   Dictated by (CST): William Harrison MD on 6/29/2024 at 7:41 PM     Finalized by (CST): William Harrison MD on 6/29/2024 at 7:42 PM              Impression:     Urinary tract infection associated with nephrostomy catheter, initial encounter ([facility])  Patient has complicated urinary tract infection in the setting of left ureteric  stent.  Urology has been consulted infectious disease has been consulted patient will receive appropriate antibiotics will likely need duration of treatment to be 2 weeks or more.    Anxiety: Patient is on mirtazapine alprazolam and fluoxetine.    GI prophylaxis: Patient will be on Protonix.    DVT prophylaxis: Patient will be on subcu heparin.  Total time spent seeing patient was 70 minutes.        Recommendations:  As above.    Thank you for allowing me to participate in the care of your patient.    ANDREAS CALDERA MD  6/29/2024

## 2024-06-30 NOTE — ED QUICK NOTES
Orders for admission, patient is aware of plan and ready to go upstairs. Any questions, please call ED RN Cristian at extension [phone].     Patient Covid vaccination status: Partially vaccinated     COVID Test Ordered in ED: None    COVID Suspicion at Admission: N/A    Running Infusions:  NS Bolus  Zosyn 200 mL/hr      Mental Status/LOC at time of transport: AxOx3    Other pertinent information:   CIWA score: N/A   NIH score:  N/A

## 2024-06-30 NOTE — CONSULTS
[facility]  part of [facility]    Consultation    Timi Hardin Patient Status:  Inpatient    1995 MRN [id number]   Location [facility] 5SW/SE Attending Dev Damon MD   Hosp Day # 1 PCP None Pcp     History of Present Illness:  Timi Hardin is a(n) 29 year old male. He has a very complicated medical and urological history w a colovesical fistula and L ureteral stricture. He has developed chills and is concerned about an infection provoking an ER visit.    History:  Past Medical History:    Anxiety    Depression    Diverticulitis    Hydronephrosis    Pneumonia due to organism    covid-19 pneaumonia 2021     History reviewed. No pertinent surgical history.  No family history on file.   reports that he has never smoked. He has never used smokeless tobacco. He reports that he does not currently use alcohol. He reports that he does not use drugs.    Allergies:  No Known Allergies    Home Medications:  No current outpatient medications on file.       Review of Systems:  Pertinent items are noted in HPI.    Physical Exam:  /69 (BP Location: Right arm)   Pulse 92   Temp 99 °F (37.2 °C)   Resp 18   Ht 5' 9\" (1.753 m)   Wt 243 lb 11.2 oz (110.5 kg)   SpO2 97%   BMI 35.99 kg/m²     General Appearance: Alert, cooperative, no distress, appears stated age  Head: Normocephalic, without obvious abnormality, atraumatic  Abdomen: Soft, non-tender, bowel sounds active all four quadrants, no masses,  no organomegaly    Laboratory Data:   Lab Results   Component Value Date    WBC 8.4 2024    HGB 15.7 2024    HCT 45.2 2024    .0 2024    CREATSERUM 0.99 2024    BUN 11 2024     2024    K 3.3 2024     2024    CO2 26.0 2024    GLU 77 2024    CA 9.5 2024    ALB 5.1 2024    ALKPHO 89 2024    BILT 1.6 2024    TP 9.7 2024    AST 24 2024    ALT 14 2024      Lab Results   Component Value Date    COLORUR Yellow 06/29/2024    CLARITY Clear 06/29/2024    SPECGRAVITY 1.025 06/29/2024    GLUUR Normal 06/29/2024    BILUR Negative 06/29/2024    KETUR Negative 06/29/2024    BLOODURINE 1+ 06/29/2024    PHURINE 6.0 06/29/2024    PROUR 30 06/29/2024    UROBILINOGEN Normal 06/29/2024    NITRITE 1+ 06/29/2024    LEUUR 500 06/29/2024       Imaging:  CONCLUSION:   1.  Linear opacities in both lung bases suggesting atelectasis or scar.   2.  Left ureteral stent is redemonstrated in stable position.  The proximal coil is in the left renal pelvis and the distal coil is in the urinary bladder.  There is mild hydronephrosis in the left kidney extending to the UPJ.  The degree of   hydronephrosis is slightly worse compared 8/6/23 CT.  Additionally, anterior to the left UPJ there is a new soft tissue focus measuring 12 x 12 mm possibly representing a lymph node or urothelial lesion.  Recommend Urology follow-up.   3.  Post sigmoid colon resection with reanastomosis.  No obstruction or inflammation at the surgical site.   4.  Linear tract of scar within the left lower quadrant likely related to prior colostomy or surgical access site.  There is a defect in their right mid abdominal wall related to prior colostomy.  There is herniation of a short segment of small bowel   through the defect without obstruction or inflammation     Assessment:    History of Colovesical Fistula  L Ureteral Stricture s.p. Repair  Chronic L Stent  Probable UTI    Plan:  He has had a stent change 5-2024 at [facility]. He is slated for another 7-20204. He has a history of UTI and possible sepsis. He should have his cultures checked(prelim-neg). He does not need a stent change at this time. I recommend supportive care w fluids and antibiotics.    Noa Bustos MD  6/30/2024  11:31 AM

## 2024-06-30 NOTE — CONSULTS
[facility], Onsite Care  TEL: [phone]  FAX: [phone]    Timi Hardin Patient Status:  Inpatient    1995 MRN [id number]   Location [facility] 5SW/SE Attending Dev Damon MD   Hosp Day # 1 PCP None Pcp     Reason for Consultation:  fever    History of Present Illness:  Timi Hardin is a a(n) 29 year old male with PMH recurrent diverticulitis and colovesicle fistula s/p resection and sigmoidectomy in  who also has hx of L urethral stricture s/p stenting who presented to ER on  with recurrent fever. According to pt and chart, pt with recurrent episodes of fever in the past. Not a lot of associated symptoms but does have am dysuria with first void.. last stent change was 2024 and pt is due in mid July for a change    In ER, urine did show pyuria and CT showed slight increase in baseline level of hydronephprosis  ID asked to eval. PT started on zosyn    OVerall feels okay today. Having some chills at present.    History:  Past Medical History:    Anxiety    Depression    Diverticulitis    Hydronephrosis    Pneumonia due to organism    covid-19 pneaumonia 2021     History reviewed. No pertinent surgical history.  No family history on file.   reports that he has never smoked. He has never used smokeless tobacco. He reports that he does not currently use alcohol. He reports that he does not use drugs.    Allergies:  No Known Allergies    Medications:    Current Facility-Administered Medications:     potassium chloride (Klor-Con M20) tab 40 mEq, 40 mEq, Oral, Q4H    acetaminophen (Tylenol Extra Strength) tab 500 mg, 500 mg, Oral, Q6H PRN    sodium chloride 0.9 % IV bolus 1,000 mL, 1,000 mL, Intravenous, Once    piperacillin-tazobactam (Zosyn) 4.5 g in dextrose 5% 100 mL IVPB-ADDV, 4.5 g, Intravenous, Once    FLUoxetine (PROzac) cap 40 mg, 40 mg, Oral, Daily    zolpidem (Ambien) tab 10 mg, 10 mg, Oral, Nightly PRN    morphINE PF 2 MG/ML injection  1 mg, 1 mg, Intravenous, Q2H PRN **OR** morphINE PF 2 MG/ML injection 2 mg, 2 mg, Intravenous, Q2H PRN **OR** morphINE PF 4 MG/ML injection 4 mg, 4 mg, Intravenous, Q2H PRN    heparin (Porcine) 5000 UNIT/ML injection 5,000 Units, 5,000 Units, Subcutaneous, Q8H HUMBERTO    acetaminophen (Tylenol Extra Strength) tab 1,000 mg, 1,000 mg, Oral, Q6H PRN    ondansetron (Zofran) 4 MG/2ML injection 4 mg, 4 mg, Intravenous, Q6H PRN    metoclopramide (Reglan) tab 10 mg, 10 mg, Oral, Q6H PRN    pantoprazole (Protonix) 40 mg in sodium chloride 0.9% PF 10 mL IV push, 40 mg, Intravenous, QAM AC **OR** pantoprazole (Protonix) DR tab 40 mg, 40 mg, Oral, QAM AC    sodium chloride 0.9% infusion, , Intravenous, Continuous    Review of Systems:    A comprehensive 10 point review of systems was completed.  Pertinent positives and negatives noted in the the HPI.    Physical Exam:    General: No acute distress. Alert and oriented x 3. Having chills  Vital signs: Blood pressure 121/69, pulse 92, temperature 98.5 °F (36.9 °C), temperature source Oral, resp. rate 18, height 175.3 cm (5' 9\"), weight 243 lb 11.2 oz (110.5 kg), SpO2 97%.  HEENT: Moist mucous membranes. Extraocular muscles are intact.  Neck: No lymphadenopathy.  Supple  Respiratory: Clear to auscultation bilaterally.  No wheezes. No rhonchi.  Cardiovascular: S1, S2.  Regular rate and rhythm.  No murmurs.  Abdomen: Soft, nontender, obese.  Positive bowel sounds.  No CVA tenderness  Ext no edema or rash    Laboratory Data:  Lab Results   Component Value Date    WBC 8.4 06/29/2024    HGB 15.7 06/29/2024    HCT 45.2 06/29/2024    .0 06/29/2024    CREATSERUM 0.99 06/29/2024    BUN 11 06/29/2024     06/29/2024    K 3.3 06/29/2024     06/29/2024    CO2 26.0 06/29/2024    GLU 77 06/29/2024    CA 9.5 06/29/2024    ALB 5.1 06/29/2024    ALKPHO 89 06/29/2024    BILT 1.6 06/29/2024    TP 9.7 06/29/2024    AST 24 06/29/2024    ALT 14 06/29/2024       Microbiologic Data:      Hospital Encounter on 06/29/24   1. Urine Culture, Routine     Status: None (Preliminary result)    Collection Time: 06/29/24  7:00 PM    Specimen: Urine, clean catch   Result Value Ref Range    Urine Culture No Growth at <18 hours N/A       Imaging:  CT ABDOMEN+PELVIS(CONTRAST ONLY)(CPT=74177)    Result Date: 6/29/2024  CONCLUSION:  1.  Linear opacities in both lung bases suggesting atelectasis or scar. 2.  Left ureteral stent is redemonstrated in stable position.  The proximal coil is in the left renal pelvis and the distal coil is in the urinary bladder.  There is mild hydronephrosis in the left kidney extending to the UPJ.  The degree of hydronephrosis is slightly worse compared 8/6/23 CT.  Additionally, anterior to the left UPJ there is a new soft tissue focus measuring 12 x 12 mm possibly representing a lymph node or urothelial lesion.  Recommend Urology follow-up. 3.  Post sigmoid colon resection with reanastomosis.  No obstruction or inflammation at the surgical site. 4.  Linear tract of scar within the left lower quadrant likely related to prior colostomy or surgical access site.  There is a defect in their right mid abdominal wall related to prior colostomy.  There is herniation of a short segment of small bowel through the defect without obstruction or inflammation.  Dictated by (CST): William Harrison MD on 6/29/2024 at 8:37 PM     Finalized by (CST): William Harrison MD on 6/29/2024 at 8:44 PM          XR CHEST AP PORTABLE  (CPT=71045)    Result Date: 6/29/2024  CONCLUSION: Centralized airway inflammation suggesting bronchitis or asthma.   Dictated by (CST): William Harrison MD on 6/29/2024 at 7:41 PM     Finalized by (CST): William Harrison MD on 6/29/2024 at 7:42 PM           Imaging results reviewed     Impression:  Patient Active Problem List   Diagnosis    Diverticulitis    Acute diverticulitis    Diverticulitis of colon    Lower abdominal pain    Pyelonephritis    Sepsis due to undetermined organism (HCC)     Enterobacter sepsis (HCC)    Complicated UTI (urinary tract infection)    Urinary tract infection associated with nephrostomy catheter, initial encounter ([facility])         ASSESSMENT:    1. Admitted with recurrent fever  -multiple possiblities, due to UTI with pyelo and hydronephrosis  -r/o underlying bacteremia  -?due to atelectasis  -GI source less likely  -?non infectious etiology of recurrent fever  2. Recurrent diverticulitis s/p sigmoidectomy 2023  3. Colovesicle fistula resection  4. L urethral stricture s/p stenting-due for stent change in July     PLAN:  Follow up cultures. Previously had Enterobacter cloacae in urine In August 2023  Check blood cx with chills   eval-may need stent change sooner than July  Cont iv zosyn  Cont supportive care. Further recs to follow as the case evolves    Thank you for allowing me to participate in the care of your patient.  Sofya Saravia MD  6/30/2024  9:51 AM

## 2024-06-30 NOTE — PLAN OF CARE
Problem: Patient Centered Care  Goal: Patient preferences are identified and integrated in the patient's plan of care  Description: Interventions:  - What would you like us to know as we care for you? From home with family  - Provide timely, complete, and accurate information to patient/family  - Incorporate patient and family knowledge, values, beliefs, and cultural backgrounds into the planning and delivery of care  - Encourage patient/family to participate in care and decision-making at the level they choose  - Honor patient and family perspectives and choices  Outcome: Progressing     Problem: Patient/Family Goals  Goal: Patient/Family Long Term Goal  Description: Patient's Long Term Goal: go home    Interventions:  - Monitor vital signs  - Monitor appropriate labs  - Administer medications per order  - Pain management as needed  - Follow MD orders  - Diagnostics per orders  - Discharge planning     - See additional Care Plan goals for specific interventions  Outcome: Progressing  Goal: Patient/Family Short Term Goal  Description: Patient's Short Term Goal: to not have fever    Interventions:   - PRN meds  -follow md orders  - See additional Care Plan goals for specific interventions  Outcome: Progressing     Problem: RISK FOR INFECTION - ADULT  Goal: Absence of fever/infection during anticipated neutropenic period  Description: INTERVENTIONS  - Monitor WBC  - Administer growth factors as ordered  - Implement neutropenic guidelines  Outcome: Progressing     Problem: DISCHARGE PLANNING  Goal: Discharge to home or other facility with appropriate resources  Description: INTERVENTIONS:  - Identify barriers to discharge w/pt and caregiver  - Include patient/family/discharge partner in discharge planning  - Arrange for needed discharge resources and transportation as appropriate  - Identify discharge learning needs (meds, wound care, etc)  - Arrange for interpreters to assist at discharge as needed  - Consider  post-discharge preferences of patient/family/discharge partner  - Complete POLST form as appropriate  - Assess patient's ability to be responsible for managing their own health  - Refer to Case Management Department for coordinating discharge planning if the patient needs post-hospital services based on physician/LIP order or complex needs related to functional status, cognitive ability or social support system  Outcome: Progressing     Problem: GENITOURINARY - ADULT  Goal: Absence of urinary retention  Description: INTERVENTIONS:  - Assess patient’s ability to void and empty bladder  - Monitor intake/output and perform bladder scan as needed  - Follow urinary retention protocol/standard of care  - Consider collaborating with pharmacy to review patient's medication profile  - Implement strategies to promote bladder emptying  Outcome: Progressing

## 2024-06-30 NOTE — PLAN OF CARE
Pt admitted from ED, febrile, PRN tylenol given. IV fluids per MD order. Oriented to the unit, safety precautions in place, call light within reach  Problem: Patient Centered Care  Goal: Patient preferences are identified and integrated in the patient's plan of care  Description: Interventions:  - What would you like us to know as we care for you? From home with family  - Provide timely, complete, and accurate information to patient/family  - Incorporate patient and family knowledge, values, beliefs, and cultural backgrounds into the planning and delivery of care  - Encourage patient/family to participate in care and decision-making at the level they choose  - Honor patient and family perspectives and choices  Outcome: Progressing     Problem: Patient/Family Goals  Goal: Patient/Family Long Term Goal  Description: Patient's Long Term Goal: go home    Interventions:  - Monitor vital signs  - Monitor appropriate labs  - Administer medications per order  - Pain management as needed  - Follow MD orders  - Diagnostics per orders  - Discharge planning     - See additional Care Plan goals for specific interventions  Outcome: Progressing  Goal: Patient/Family Short Term Goal  Description: Patient's Short Term Goal: to not have fever    Interventions:   - PRN meds  -follow md orders  - See additional Care Plan goals for specific interventions  Outcome: Progressing     Problem: RISK FOR INFECTION - ADULT  Goal: Absence of fever/infection during anticipated neutropenic period  Description: INTERVENTIONS  - Monitor WBC  - Administer growth factors as ordered  - Implement neutropenic guidelines  Outcome: Progressing     Problem: DISCHARGE PLANNING  Goal: Discharge to home or other facility with appropriate resources  Description: INTERVENTIONS:  - Identify barriers to discharge w/pt and caregiver  - Include patient/family/discharge partner in discharge planning  - Arrange for needed discharge resources and transportation as  appropriate  - Identify discharge learning needs (meds, wound care, etc)  - Arrange for interpreters to assist at discharge as needed  - Consider post-discharge preferences of patient/family/discharge partner  - Complete POLST form as appropriate  - Assess patient's ability to be responsible for managing their own health  - Refer to Case Management Department for coordinating discharge planning if the patient needs post-hospital services based on physician/LIP order or complex needs related to functional status, cognitive ability or social support system  Outcome: Progressing     Problem: GENITOURINARY - ADULT  Goal: Absence of urinary retention  Description: INTERVENTIONS:  - Assess patient’s ability to void and empty bladder  - Monitor intake/output and perform bladder scan as needed  - Follow urinary retention protocol/standard of care  - Consider collaborating with pharmacy to review patient's medication profile  - Implement strategies to promote bladder emptying  Outcome: Progressing

## 2024-07-01 VITALS
TEMPERATURE: 98 F | HEART RATE: 76 BPM | RESPIRATION RATE: 18 BRPM | DIASTOLIC BLOOD PRESSURE: 77 MMHG | WEIGHT: 243.69 LBS | BODY MASS INDEX: 36.09 KG/M2 | SYSTOLIC BLOOD PRESSURE: 130 MMHG | OXYGEN SATURATION: 98 % | HEIGHT: 69 IN

## 2024-07-01 LAB
MAGNESIUM SERPL-MCNC: 1.8 MG/DL (ref 1.6–2.6)
PHOSPHATE SERPL-MCNC: 2.7 MG/DL (ref 2.4–5.1)
POTASSIUM SERPL-SCNC: 3.5 MMOL/L (ref 3.5–5.1)
POTASSIUM SERPL-SCNC: 4.2 MMOL/L (ref 3.5–5.1)

## 2024-07-01 PROCEDURE — 84100 ASSAY OF PHOSPHORUS: CPT | Performed by: INTERNAL MEDICINE

## 2024-07-01 PROCEDURE — 84132 ASSAY OF SERUM POTASSIUM: CPT | Performed by: HOSPITALIST

## 2024-07-01 PROCEDURE — 84132 ASSAY OF SERUM POTASSIUM: CPT | Performed by: INTERNAL MEDICINE

## 2024-07-01 PROCEDURE — 83735 ASSAY OF MAGNESIUM: CPT | Performed by: INTERNAL MEDICINE

## 2024-07-01 RX ORDER — MAGNESIUM OXIDE 400 MG/1
400 TABLET ORAL ONCE
Status: COMPLETED | OUTPATIENT
Start: 2024-07-01 | End: 2024-07-01

## 2024-07-01 RX ORDER — DOXYCYCLINE HYCLATE 100 MG/1
100 CAPSULE ORAL 2 TIMES DAILY
Qty: 14 CAPSULE | Refills: 0 | Status: SHIPPED | OUTPATIENT
Start: 2024-07-01 | End: 2024-07-08

## 2024-07-01 RX ORDER — POTASSIUM CHLORIDE 20 MEQ/1
40 TABLET, EXTENDED RELEASE ORAL EVERY 4 HOURS
Status: COMPLETED | OUTPATIENT
Start: 2024-07-01 | End: 2024-07-01

## 2024-07-01 RX ORDER — CEFADROXIL 500 MG/1
1 CAPSULE ORAL 2 TIMES DAILY
Qty: 28 CAPSULE | Refills: 0 | Status: SHIPPED | OUTPATIENT
Start: 2024-07-01 | End: 2024-07-08

## 2024-07-01 NOTE — PAYOR COMM NOTE
--------------  ADMISSION REVIEW     Payor: [employer]  Subscriber #:  [id number]  Authorization Number: [id number]    Admit date: 6/29/24  Admit time: 10:37 PM       History   30yo/m w hx of colonic resection after multiple episodes of diverticulitis, colovesicular fistula s/p sigmoidectomy in 2023, left ureteral stricture with stenting. No vomiting. Minimal cough. No chest pain. No flank pain. No urinary symptoms. No head, neck, back pain. No dysuria. Reports multiple recent episodes of the same with observation admissions and unclear etiology of fever. NO vomiting or diarrhea. No headaches. No trouble breathing. No sore throat. No rashes.     ED Triage Vitals [06/29/24 1822]   /88   Pulse (!) 122   Resp 20   Temp (!) 100.9 °F (38.3 °C)   Temp src Oral   SpO2 100 %   O2 Device None (Room air)   HENT:      Head: Normocephalic and atraumatic.      Nose: Nose normal.      Mouth/Throat:      Mouth: Mucous membranes are moist.   Eyes:      Conjunctiva/sclera: Conjunctivae normal.      Pupils: Pupils are equal, round, and reactive to light.   Cardiovascular:      Rate and Rhythm: Normal rate and regular rhythm.      Heart sounds: Normal heart sounds.   Pulmonary:      Effort: Pulmonary effort is normal.      Breath sounds: Normal breath sounds.   Abdominal:      General: Bowel sounds are normal.      Palpations: Abdomen is soft.   Musculoskeletal:         General: No tenderness or deformity. Normal range of motion.      Cervical back: Normal range of motion and neck supple.   Skin:     General: Skin is warm and dry.      Capillary Refill: Capillary refill takes less than 2 seconds.      Findings: No rash.      Comments: Normal color   Neurological:      General: No focal deficit present.      Mental Status: He is alert and oriented to person, place, and time.      GCS: GCS eye subscore is 4. GCS verbal subscore is 5. GCS motor subscore is 6.      Cranial Nerves: No cranial nerve deficit.      Gait: Gait normal.      Labs Reviewed   COMP METABOLIC PANEL (14) - Abnormal; Notable for the following components:       Result Value    Potassium 3.3 (*)     Bilirubin, Total 1.6 (*)     Total Protein 9.7 (*)     Albumin 5.1 (*)     Globulin  4.6 (*)     All other components within normal limits   URINALYSIS WITH CULTURE REFLEX - Abnormal; Notable for the following components:    Blood Urine 1+ (*)     Protein Urine 30 (*)     Nitrite Urine 1+ (*)     Leukocyte Esterase Urine 500 (*)     WBC Urine >50 (*)     RBC Urine >10 (*)     Squamous Epi. Cells Few (*)      Disposition and Plan   Clinical Impression:  1. Urinary tract infection associated with nephrostomy catheter, initial encounter ([facility])       Disposition:  Admit  6/29/2024  9:12 pm       H&P  Reason for Admission:   Pyelonephritis of the left kidney.  Complicated UTI.     History of Present Illness:   Patient is a 29 year old male who was admitted to the hospital for Urinary tract infection associated with nephrostomy catheter, initial encounter ([facility]):  Mr. Hardin is a very pleasant 29-year-old gentleman who has history of 2 years of ongoing urinary tract problems since he had a complicated episode of diverticulitis with associated involvement of his left ureteropelvic junction.  Patient had a stent placed in the left ureter and also reconstruction of the ureteropelvic junction 2 years ago.  This time patient presented with abdominal pain in the left-sided pain which happened suddenly over 1 to 2 days.  Patient denies any emesis but does report nausea patient also has no GI complaints of diarrhea or constipation.  Patient did report fever with chills.     Vitals:     06/29/24 2240   BP: 111/70   Pulse: 97   Resp: 16   Temp: (!) 100.8 °F (38.2 °C)      No intake or output data in the 24 hours ending 06/29/24 2353      Wt Readings from Last 2 Encounters:   06/29/24 243 lb 11.2 oz (110.5 kg)   12/27/23 225 lb (102.1 kg)         General appearance:  alert, appears stated age, and  cooperative  Head: Normocephalic, without obvious abnormality, atraumatic  Eyes: conjunctivae/corneas clear. PERRL, EOM's intact. Fundi benign.  Ears: normal TM's and external ear canals both ears  Nose: Nares normal. Septum midline. Mucosa normal. No drainage or sinus tenderness.  Throat: lips, mucosa, and tongue normal; teeth and gums normal  Neck: no adenopathy, no carotid bruit, no JVD, supple, symmetrical, trachea midline, and thyroid not enlarged, symmetric, no tenderness/mass/nodules  Pulmonary: clear to auscultation bilaterally  Cardiovascular: S1, S2 normal, no murmur, click, rub or gallop, regular rate and rhythm  Abdominal: soft, non-tender; bowel sounds normal; no masses,  no organomegaly  Extremities: extremities normal, atraumatic, no cyanosis or edema  Pulses: 2+ and symmetric  Skin: Skin color, texture, turgor normal. No rashes or lesions  Neurologic: Grossly normal    Lab Results   Component Value Date     WBC 8.4 06/29/2024     HGB 15.7 06/29/2024     HCT 45.2 06/29/2024     .0 06/29/2024      06/29/2024     K 3.3 (L) 06/29/2024      06/29/2024     CO2 26.0 06/29/2024     CREATSERUM 0.99 06/29/2024     BUN 11 06/29/2024     GLU 77 06/29/2024     CA 9.5 06/29/2024     ALB 5.1 (H) 06/29/2024     ALKPHO 89 06/29/2024     BILT 1.6 (H) 06/29/2024     TP 9.7 (H) 06/29/2024     AST 24 06/29/2024     ALT 14 06/29/2024   CT ABDOMEN+PELVIS(CONTRAST ONLY)(CPT=74177)   Result Date: 6/29/2024  CONCLUSION:         1.  Linear opacities in both lung bases suggesting atelectasis or scar. 2.  Left ureteral stent is redemonstrated in stable position.  The proximal coil is in the left renal pelvis and the distal coil is in the urinary bladder.  There is mild hydronephrosis in the left kidney extending to the UPJ.  The degree of hydronephrosis is slightly worse compared 8/6/23 CT.  Additionally, anterior to the left UPJ there is a new soft tissue focus measuring 12 x 12 mm possibly representing a  lymph node or urothelial lesion.  Recommend Urology follow-up. 3.  Post sigmoid colon resection with reanastomosis.  No obstruction or inflammation at the surgical site. 4.  Linear tract of scar within the left lower quadrant likely related to prior colostomy or surgical access site.  There is a defect in their right mid abdominal wall related to prior colostomy.  There is herniation of a short segment of small bowel through the defect without obstruction or inflammation.  Dictated by (CST): William Harrison MD on 6/29/2024 at 8:37 PM     Finalized by (CST): William Harrison MD on 6/29/2024 at 8:44 PM           XR CHEST AP PORTABLE  (CPT=71045)   Result Date: 6/29/2024  CONCLUSION:        Centralized airway inflammation suggesting bronchitis or asthma.   Dictated by (CST): William Harrison MD on 6/29/2024 at 7:41 PM     Finalized by (CST): William Harrison MD on 6/29/2024 at 7:42 PM              Impression:     Urinary tract infection associated with nephrostomy catheter, initial encounter ([facility])  Patient has complicated urinary tract infection in the setting of left ureteric stent.  Urology has been consulted infectious disease has been consulted patient will receive appropriate antibiotics will likely need duration of treatment to be 2 weeks or more.     GI prophylaxis: Patient will be on Protonix.     DVT prophylaxis: Patient will be on subcu heparin.    MEDICATIONS ADMINISTERED IN LAST 1 DAY:  acetaminophen (Tylenol Extra Strength) tab 1,000 mg       Date Action Dose Route User    6/30/2024 2331 Given 1,000 mg Oral Clover Rodriguez, MILIND          FLUoxetine (PROzac) cap 40 mg       Date Action Dose Route User    7/1/2024 0826 Given 40 mg Oral Noa Richmond RN          heparin (Porcine) 5000 UNIT/ML injection 5,000 Units       Date Action Dose Route User    7/1/2024 0826 Given 5,000 Units Subcutaneous (Left Lower Abdomen) Noa Richmond RN    6/30/2024 2343 Given 5,000 Units Subcutaneous (Right Upper Arm) Clover Rodriguez, MILIND     6/30/2024 1527 Given 5,000 Units Subcutaneous (Right Lower Abdomen) Sushma Cortez RN          magnesium oxide (Mag-Ox) tab 400 mg       Date Action Dose Route User    6/30/2024 1831 Given 400 mg Oral Sushma Cortez RN          magnesium oxide (Mag-Ox) tab 400 mg       Date Action Dose Route User    7/1/2024 1046 Given 400 mg Oral Noa Richmond RN          pantoprazole (Protonix) DR tab 40 mg       Date Action Dose Route User    7/1/2024 0627 Given 40 mg Oral Clover Rodriguez RN          piperacillin-tazobactam (Zosyn) 3.375 g in dextrose 5% 100 mL IVPB-ADDV       Date Action Dose Route User    7/1/2024 0627 New Bag 3.375 g Intravenous Clover Rodriguez RN    6/30/2024 2331 New Bag 3.375 g Intravenous Clover Rodriguez RN    6/30/2024 1527 New Bag 3.375 g Intravenous Sushma Cortez RN          potassium chloride (Klor-Con M20) tab 40 mEq       Date Action Dose Route User    7/1/2024 1046 Given 40 mEq Oral Noa Richmond RN          potassium phosphate dibasic 15 mmol in sodium chloride 0.9% 250 mL IVPB       Date Action Dose Route User    6/30/2024 1831 New Bag 15 mmol Intravenous Sushma Cortez RN          sodium chloride 0.9% infusion       Date Action Dose Route User    6/30/2024 1831 New Bag (none) Intravenous Sushma Cortez RN            Vitals (last day)       Date/Time Temp Pulse Resp BP SpO2 Weight O2 Device O2 Flow Rate (L/min) Who    07/01/24 0824 98.3 °F (36.8 °C) 78 16 93/52 97 % -- None (Room air) -- AB    06/30/24 2300 102.3 °F (39.1 °C) 110 18 111/59 98 % -- None (Room air) -- PK    06/30/24 1721 98.8 °F (37.1 °C) -- -- -- -- -- -- -- CH    06/30/24 1523 100.7 °F (38.2 °C) 103 20 109/59 95 % -- None (Room air) -- CH    06/30/24 1219 100.4 °F (38 °C) -- -- -- -- -- -- --     06/30/24 1046 99 °F (37.2 °C) -- -- -- -- -- -- --     06/30/24 0906 98.5 °F (36.9 °C) 92 18 121/69 97 % -- None (Room air) --     06/30/24 0636 98.1 °F (36.7 °C) 93 16 118/72 98 % -- None (Room air)  --     06/30/24 0209 99.3 °F (37.4 °C) -- -- -- -- -- -- -- PK

## 2024-07-01 NOTE — PROGRESS NOTES
[facility]  part of [facility]    Progress Note    Timi Hardin Patient Status:  Inpatient    1995 MRN [id number]   Location [facility] 5SW/SE Attending Dev Damon MD   Hosp Day # 1 PCP None Pcp       Subjective:   Timi Hardin is a(n) 29 year old male     Objective:     Vitals:    24 1721   BP:    Pulse:    Resp:    Temp: 98.8 °F (37.1 °C)       Intake/Output Summary (Last 24 hours) at 2024  Last data filed at 2024 1047  Gross per 24 hour   Intake 1358 ml   Output 1 ml   Net 1357 ml     Wt Readings from Last 2 Encounters:   24 243 lb 11.2 oz (110.5 kg)   23 225 lb (102.1 kg)       General appearance:  alert, appears stated age, and cooperative  Head: Normocephalic, without obvious abnormality, atraumatic  Eyes: conjunctivae/corneas clear. PERRL, EOM's intact. Fundi benign.  Neck: no adenopathy, no carotid bruit, no JVD, supple, symmetrical, trachea midline, and thyroid not enlarged, symmetric, no tenderness/mass/nodules  Pulmonary: clear to auscultation bilaterally  Cardiovascular: S1, S2 normal, no murmur, click, rub or gallop, regular rate and rhythm  Abdominal: soft, non-tender; bowel sounds normal; no masses,  no organomegaly  Extremities: extremities normal, atraumatic, no cyanosis or edema  Pulses: 2+ and symmetric  Neurologic: Grossly normal  Genital Exam: defer exam    Current Medications:    Current Facility-Administered Medications:     piperacillin-tazobactam (Zosyn) 3.375 g in dextrose 5% 100 mL IVPB-ADDV, 3.375 g, Intravenous, Q8H    potassium phosphate dibasic 15 mmol in sodium chloride 0.9% 250 mL IVPB, 15 mmol, Intravenous, Once    acetaminophen (Tylenol Extra Strength) tab 500 mg, 500 mg, Oral, Q6H PRN    sodium chloride 0.9 % IV bolus 1,000 mL, 1,000 mL, Intravenous, Once    piperacillin-tazobactam (Zosyn) 4.5 g in dextrose 5% 100 mL IVPB-ADDV, 4.5 g, Intravenous, Once    FLUoxetine (PROzac) cap 40 mg, 40 mg, Oral,  Daily    zolpidem (Ambien) tab 10 mg, 10 mg, Oral, Nightly PRN    morphINE PF 2 MG/ML injection 1 mg, 1 mg, Intravenous, Q2H PRN **OR** morphINE PF 2 MG/ML injection 2 mg, 2 mg, Intravenous, Q2H PRN **OR** morphINE PF 4 MG/ML injection 4 mg, 4 mg, Intravenous, Q2H PRN    heparin (Porcine) 5000 UNIT/ML injection 5,000 Units, 5,000 Units, Subcutaneous, Q8H HUMBERTO    acetaminophen (Tylenol Extra Strength) tab 1,000 mg, 1,000 mg, Oral, Q6H PRN    ondansetron (Zofran) 4 MG/2ML injection 4 mg, 4 mg, Intravenous, Q6H PRN    metoclopramide (Reglan) tab 10 mg, 10 mg, Oral, Q6H PRN    pantoprazole (Protonix) 40 mg in sodium chloride 0.9% PF 10 mL IV push, 40 mg, Intravenous, QAM AC **OR** pantoprazole (Protonix) DR tab 40 mg, 40 mg, Oral, QAM AC    sodium chloride 0.9% infusion, , Intravenous, Continuous    Allergies  No Known Allergies    Assessment and Plan:      Urinary tract infection associated with nephrostomy catheter, initial encounter ([facility])  Patient has complicated urinary tract infection in the setting of left ureteric stent.  Urology has been consulted infectious disease has been consulted patient will receive appropriate antibiotics will likely need duration of treatment to be 2 weeks or more.     Anxiety: Patient is on mirtazapine alprazolam and fluoxetine.     GI prophylaxis: Patient will be on Protonix.  Results:     Lab Results   Component Value Date    WBC 7.9 06/30/2024    HGB 12.1 (L) 06/30/2024    HCT 35.9 (L) 06/30/2024    .0 06/30/2024     06/30/2024    K 3.7 06/30/2024    K 3.7 06/30/2024     06/30/2024    CO2 23.0 06/30/2024    CREATSERUM 0.81 06/30/2024    BUN 8 (L) 06/30/2024     (H) 06/30/2024    CA 8.3 (L) 06/30/2024    ALB 3.8 06/30/2024    ALKPHO 62 06/30/2024    BILT 0.8 06/30/2024    TP 7.3 06/30/2024    AST 21 06/30/2024    ALT 12 06/30/2024    LIP 18 07/19/2023    MG 1.6 06/30/2024    PHOS 2.0 (L) 06/30/2024     Hospital Encounter on 06/29/24   1. Blood Culture      Status: None (Preliminary result)    Collection Time: 06/29/24  7:04 PM    Specimen: Blood,peripheral   Result Value Ref Range    Blood Culture Result No Growth 1 Day N/A   2. Urine Culture, Routine     Status: None (Preliminary result)    Collection Time: 06/29/24  7:00 PM    Specimen: Urine, clean catch   Result Value Ref Range    Urine Culture No Growth at <18 hours N/A       CT ABDOMEN+PELVIS(CONTRAST ONLY)(CPT=74177)    Result Date: 6/29/2024  CONCLUSION:  1.  Linear opacities in both lung bases suggesting atelectasis or scar. 2.  Left ureteral stent is redemonstrated in stable position.  The proximal coil is in the left renal pelvis and the distal coil is in the urinary bladder.  There is mild hydronephrosis in the left kidney extending to the UPJ.  The degree of hydronephrosis is slightly worse compared 8/6/23 CT.  Additionally, anterior to the left UPJ there is a new soft tissue focus measuring 12 x 12 mm possibly representing a lymph node or urothelial lesion.  Recommend Urology follow-up. 3.  Post sigmoid colon resection with reanastomosis.  No obstruction or inflammation at the surgical site. 4.  Linear tract of scar within the left lower quadrant likely related to prior colostomy or surgical access site.  There is a defect in their right mid abdominal wall related to prior colostomy.  There is herniation of a short segment of small bowel through the defect without obstruction or inflammation.  Dictated by (CST): William Harrison MD on 6/29/2024 at 8:37 PM     Finalized by (CST): William Harrison MD on 6/29/2024 at 8:44 PM          XR CHEST AP PORTABLE  (CPT=71045)    Result Date: 6/29/2024  CONCLUSION: Centralized airway inflammation suggesting bronchitis or asthma.   Dictated by (CST): William Harrison MD on 6/29/2024 at 7:41 PM     Finalized by (CST): William Harrison MD on 6/29/2024 at 7:42 PM                       ANDREAS CALDERA MD  6/30/2024

## 2024-07-01 NOTE — CM/SW NOTE
07/01/24 1300   CM/SW Referral Data   Referral Source Physician   Reason for Referral Discharge planning;Other   Informant Patient   Medical Hx   Does patient have an established PCP? No   Patient Info   Patient's Current Mental Status at Time of Assessment Alert;Oriented   Patient's Home Environment Condo/Apt no elevator   Patient lives with Daughter   Patient Status Prior to Admission   Independent with ADLs and Mobility Yes   Discharge Needs   Anticipated D/C needs Infusion care     SW received MDO for SDOH, and SW initiated self referral for discharge planning with possibility of IV ABX at DC.  Sw introduced self and role to patient. Pt provided above information. Pt was informed that he may need IV ABX upon DC. SW received a call from [facility] informing that patient is covered 100% if he goes to in office infusions. Patient is aware. Patient is agreeable. SW reviewed SDOHs with patient. Patient confirmed that he does not need any resources at this time.     Plan: Pending medical clearance, DC to Home with [facility] *[facility] appt for in office.     SW/CM to remain available for support and/or discharge planning.     Colleen Bull, MSW, LSW   x [phone]

## 2024-07-01 NOTE — PLAN OF CARE
No acute changes. No complaints of pain today/no fevers. Safety precautions in place. Call light is within reach.     Problem: Patient Centered Care  Goal: Patient preferences are identified and integrated in the patient's plan of care  Description: Interventions:  - What would you like us to know as we care for you? From home with family  - Provide timely, complete, and accurate information to patient/family  - Incorporate patient and family knowledge, values, beliefs, and cultural backgrounds into the planning and delivery of care  - Encourage patient/family to participate in care and decision-making at the level they choose  - Honor patient and family perspectives and choices  Outcome: Progressing     Problem: DISCHARGE PLANNING  Goal: Discharge to home or other facility with appropriate resources  Description: INTERVENTIONS:  - Identify barriers to discharge w/pt and caregiver  - Include patient/family/discharge partner in discharge planning  - Arrange for needed discharge resources and transportation as appropriate  - Identify discharge learning needs (meds, wound care, etc)  - Arrange for interpreters to assist at discharge as needed  - Consider post-discharge preferences of patient/family/discharge partner  - Complete POLST form as appropriate  - Assess patient's ability to be responsible for managing their own health  - Refer to Case Management Department for coordinating discharge planning if the patient needs post-hospital services based on physician/LIP order or complex needs related to functional status, cognitive ability or social support system  Outcome: Progressing

## 2024-07-01 NOTE — CONSULTS
[facility] [place]  TEL: [phone]  FAX: [phone]    Timi Hardin Patient Status:  Inpatient    1995 MRN [id number]   Location [facility] 5SW/SE Attending Dev Damon MD   Hosp Day # 2 PCP None Pcp     Reason for Consultation:  fever      Interval Hx  Temp last night but feels much better today. No flank pain or hematuria.     Staph species from UCx. Not Staph aureus.   Blood cx negative.       History of Present Illness:  Timi Hardin is a a(n) 29 year old male with PMH recurrent diverticulitis and colovesicle fistula s/p resection and sigmoidectomy in  who also has hx of L urethral stricture s/p stenting who presented to ER on  with recurrent fever. According to pt and chart, pt with recurrent episodes of fever in the past. Not a lot of associated symptoms but does have am dysuria with first void.. last stent change was 2024 and pt is due in mid July for a change    In ER, urine did show pyuria and CT showed slight increase in baseline level of hydronephprosis  ID asked to eval. PT started on zosyn    OVerall feels okay today. Having some chills at present.    History:  Past Medical History:    Anxiety    Depression    Diverticulitis    Hydronephrosis    Pneumonia due to organism    covid-19 pneaumonia 2021     History reviewed. No pertinent surgical history.  No family history on file.   reports that he has never smoked. He has never used smokeless tobacco. He reports that he does not currently use alcohol. He reports that he does not use drugs.    Allergies:  No Known Allergies    Medications:    Current Facility-Administered Medications:     piperacillin-tazobactam (Zosyn) 3.375 g in dextrose 5% 100 mL IVPB-ADDV, 3.375 g, Intravenous, Q8H    acetaminophen (Tylenol Extra Strength) tab 500 mg, 500 mg, Oral, Q6H PRN    sodium chloride 0.9 % IV bolus 1,000 mL, 1,000 mL, Intravenous, Once    piperacillin-tazobactam (Zosyn) 4.5 g in  dextrose 5% 100 mL IVPB-ADDV, 4.5 g, Intravenous, Once    FLUoxetine (PROzac) cap 40 mg, 40 mg, Oral, Daily    zolpidem (Ambien) tab 10 mg, 10 mg, Oral, Nightly PRN    morphINE PF 2 MG/ML injection 1 mg, 1 mg, Intravenous, Q2H PRN **OR** morphINE PF 2 MG/ML injection 2 mg, 2 mg, Intravenous, Q2H PRN **OR** morphINE PF 4 MG/ML injection 4 mg, 4 mg, Intravenous, Q2H PRN    heparin (Porcine) 5000 UNIT/ML injection 5,000 Units, 5,000 Units, Subcutaneous, Q8H HUMBERTO    acetaminophen (Tylenol Extra Strength) tab 1,000 mg, 1,000 mg, Oral, Q6H PRN    ondansetron (Zofran) 4 MG/2ML injection 4 mg, 4 mg, Intravenous, Q6H PRN    metoclopramide (Reglan) tab 10 mg, 10 mg, Oral, Q6H PRN    pantoprazole (Protonix) 40 mg in sodium chloride 0.9% PF 10 mL IV push, 40 mg, Intravenous, QAM AC **OR** pantoprazole (Protonix) DR tab 40 mg, 40 mg, Oral, QAM AC    sodium chloride 0.9% infusion, , Intravenous, Continuous    Review of Systems:    A comprehensive 10 point review of systems was completed.  Pertinent positives and negatives noted in the the HPI.    Physical Exam:    General: No acute distress. Alert and oriented x 3. Having chills  Vital signs: Blood pressure 120/75, pulse 78, temperature 98.5 °F (36.9 °C), temperature source Oral, resp. rate 18, height 5' 9\" (1.753 m), weight 243 lb 11.2 oz (110.5 kg), SpO2 98%.  HEENT: Moist mucous membranes. Extraocular muscles are intact.  Neck: No lymphadenopathy.  Supple  Respiratory: Clear to auscultation bilaterally.  No wheezes. No rhonchi.  Cardiovascular: S1, S2.  Regular rate and rhythm.  No murmurs.  Abdomen: Soft, nontender, obese.  Positive bowel sounds.  No CVA tenderness  Ext no edema or rash    Laboratory Data:  Lab Results   Component Value Date    WBC 7.9 06/30/2024    HGB 12.1 06/30/2024    HCT 35.9 06/30/2024    .0 06/30/2024    CREATSERUM 0.81 06/30/2024    BUN 8 06/30/2024     06/30/2024    K 3.5 07/01/2024     06/30/2024    CO2 23.0 06/30/2024      06/30/2024    CA 8.3 06/30/2024    ALB 3.8 06/30/2024    ALKPHO 62 06/30/2024    BILT 0.8 06/30/2024    TP 7.3 06/30/2024    AST 21 06/30/2024    ALT 12 06/30/2024    MG 1.8 07/01/2024    PHOS 2.7 07/01/2024       Microbiologic Data:     Hospital Encounter on 06/29/24   1. Blood Culture     Status: None (Preliminary result)    Collection Time: 06/30/24 11:23 AM    Specimen: Blood,peripheral   Result Value Ref Range    Blood Culture Result No Growth 1 Day N/A   2. Urine Culture, Routine     Status: Abnormal    Collection Time: 06/29/24  7:00 PM    Specimen: Urine, clean catch   Result Value Ref Range    Urine Culture >100,000 CFU/ML Staphylococcus species, not aureus (A) N/A       Imaging:  CT ABDOMEN+PELVIS(CONTRAST ONLY)(CPT=74177)    Result Date: 6/29/2024  CONCLUSION:  1.  Linear opacities in both lung bases suggesting atelectasis or scar. 2.  Left ureteral stent is redemonstrated in stable position.  The proximal coil is in the left renal pelvis and the distal coil is in the urinary bladder.  There is mild hydronephrosis in the left kidney extending to the UPJ.  The degree of hydronephrosis is slightly worse compared 8/6/23 CT.  Additionally, anterior to the left UPJ there is a new soft tissue focus measuring 12 x 12 mm possibly representing a lymph node or urothelial lesion.  Recommend Urology follow-up. 3.  Post sigmoid colon resection with reanastomosis.  No obstruction or inflammation at the surgical site. 4.  Linear tract of scar within the left lower quadrant likely related to prior colostomy or surgical access site.  There is a defect in their right mid abdominal wall related to prior colostomy.  There is herniation of a short segment of small bowel through the defect without obstruction or inflammation.  Dictated by (CST): William Harrison MD on 6/29/2024 at 8:37 PM     Finalized by (CST): William Harrison MD on 6/29/2024 at 8:44 PM          XR CHEST AP PORTABLE  (CPT=71045)    Result Date:  6/29/2024  CONCLUSION: Centralized airway inflammation suggesting bronchitis or asthma.   Dictated by (CST): William Harrison MD on 6/29/2024 at 7:41 PM     Finalized by (CST): William Harrison MD on 6/29/2024 at 7:42 PM           Imaging results reviewed     Impression:  Patient Active Problem List   Diagnosis    Diverticulitis    Acute diverticulitis    Diverticulitis of colon    Lower abdominal pain    Pyelonephritis    Sepsis due to undetermined organism ([facility])    Enterobacter sepsis ([facility])    Complicated UTI (urinary tract infection)    Urinary tract infection associated with nephrostomy catheter, initial encounter ([facility])         ASSESSMENT:    1. Admitted with recurrent fever  -multiple possiblities, due to UTI with pyelo and hydronephrosis  -Staph species from urine--in setting of ureteral stent.  Consider possible pathogen.   -r/o underlying bacteremia--BCx ngtd  -?due to atelectasis  -GI source less likely  -?non infectious etiology of recurrent fever  2. Recurrent diverticulitis s/p sigmoidectomy 2023  3. Colovesicle fistula resection  4. L urethral stricture s/p stenting-due for stent change in July     PLAN:  OK for home on high dose cefadroxil plus doxy x 7 days  Call with new fever/chills  F/u with outpt   No scheduled ID f/u  D/w MILIND

## 2024-07-01 NOTE — PAYOR COMM NOTE
--------------  CONTINUED STAY REVIEW    Payor: [employer]  Subscriber #:  [id number]  Authorization Number: [id number]    Admit date: 6/29/24  Admit time: 10:37 PM      6/30/24     General appearance:  alert, appears stated age, and cooperative  Head: Normocephalic, without obvious abnormality, atraumatic  Eyes: conjunctivae/corneas clear. PERRL, EOM's intact. Fundi benign.  Neck: no adenopathy, no carotid bruit, no JVD, supple, symmetrical, trachea midline, and thyroid not enlarged, symmetric, no tenderness/mass/nodules  Pulmonary: clear to auscultation bilaterally  Cardiovascular: S1, S2 normal, no murmur, click, rub or gallop, regular rate and rhythm  Abdominal: soft, non-tender; bowel sounds normal; no masses,  no organomegaly  Extremities: extremities normal, atraumatic, no cyanosis or edema  Pulses: 2+ and symmetric  Neurologic: Grossly normal  Genital Exam: defer exam      Assessment and Plan:      Urinary tract infection associated with nephrostomy catheter, initial encounter ([facility])  Patient has complicated urinary tract infection in the setting of left ureteric stent.  Urology has been consulted infectious disease has been consulted patient will receive appropriate antibiotics will likely need duration of treatment to be 2 weeks or more.      GI prophylaxis: Patient will be on Protonix.    Component Value Date     WBC 7.9 06/30/2024     HGB 12.1 (L) 06/30/2024     HCT 35.9 (L) 06/30/2024     .0 06/30/2024      06/30/2024     K 3.7 06/30/2024     K 3.7 06/30/2024      06/30/2024     CO2 23.0 06/30/2024     CREATSERUM 0.81 06/30/2024     BUN 8 (L) 06/30/2024      (H) 06/30/2024     CA 8.3 (L) 06/30/2024     ALB 3.8 06/30/2024     ALKPHO 62 06/30/2024     BILT 0.8 06/30/2024     TP 7.3 06/30/2024     AST 21 06/30/2024     ALT 12 06/30/2024     MG 1.6 06/30/2024     PHOS 2.0 (L) 06/30/2024         INFECTIOUS DISEASE  ASSESSMENT:     1. Admitted with recurrent fever  -multiple possiblities,  due to UTI with pyelo and hydronephrosis  -r/o underlying bacteremia  -?due to atelectasis  -GI source less likely  -?non infectious etiology of recurrent fever  2. Recurrent diverticulitis s/p sigmoidectomy 2023  3. Colovesicle fistula resection  4. L urethral stricture s/p stenting-due for stent change in July      PLAN:  Follow up cultures. Previously had Enterobacter cloacae in urine In August 2023  Check blood cx with chills   eval-may need stent change sooner than July  Cont iv zosyn    MEDICATIONS ADMINISTERED IN LAST 1 DAY:  acetaminophen (Tylenol Extra Strength) tab 1,000 mg       Date Action Dose Route User    6/30/2024 2331 Given 1,000 mg Oral Clover Rodriguez RN          FLUoxetine (PROzac) cap 40 mg       Date Action Dose Route User    7/1/2024 0826 Given 40 mg Oral Noa Richmond RN          heparin (Porcine) 5000 UNIT/ML injection 5,000 Units       Date Action Dose Route User    7/1/2024 0826 Given 5,000 Units Subcutaneous (Left Lower Abdomen) Noa Richmond RN    6/30/2024 2343 Given 5,000 Units Subcutaneous (Right Upper Arm) Clover Rodriguez RN    6/30/2024 1527 Given 5,000 Units Subcutaneous (Right Lower Abdomen) Sushma Cortez RN          magnesium oxide (Mag-Ox) tab 400 mg       Date Action Dose Route User    6/30/2024 1831 Given 400 mg Oral Sushma Cortez RN          magnesium oxide (Mag-Ox) tab 400 mg       Date Action Dose Route User    7/1/2024 1046 Given 400 mg Oral Noa Richmond RN          pantoprazole (Protonix) DR tab 40 mg       Date Action Dose Route User    7/1/2024 0627 Given 40 mg Oral Clover Rodriguez RN          piperacillin-tazobactam (Zosyn) 3.375 g in dextrose 5% 100 mL IVPB-ADDV       Date Action Dose Route User    7/1/2024 0627 New Bag 3.375 g Intravenous Clover Rodriguez RN    6/30/2024 2331 New Bag 3.375 g Intravenous Clover Rodriguez RN    6/30/2024 1527 New Bag 3.375 g Intravenous Sushma Cortez RN          potassium chloride (Klor-Con M20) tab 40 mEq        Date Action Dose Route User    7/1/2024 1046 Given 40 mEq Oral Noa Richmond RN          potassium phosphate dibasic 15 mmol in sodium chloride 0.9% 250 mL IVPB       Date Action Dose Route User    6/30/2024 1831 New Bag 15 mmol Intravenous Sushma Cortez, RN          sodium chloride 0.9% infusion       Date Action Dose Route User    6/30/2024 1831 New Bag (none) Intravenous Sushma Cortez, RN            Vitals (last day)       Date/Time Temp Pulse Resp BP SpO2 Weight O2 Device O2 Flow Rate (L/min) Who    07/01/24 0824 98.3 °F (36.8 °C) 78 16 93/52 97 % -- None (Room air) -- AB    07/01/24 0626 98 °F (36.7 °C) 73 16 99/58 97 % -- None (Room air) -- PK    06/30/24 2300 102.3 °F (39.1 °C) 110 18 111/59 98 % -- None (Room air) -- PK    06/30/24 1721 98.8 °F (37.1 °C) -- -- -- -- -- -- -- CH    06/30/24 1523 100.7 °F (38.2 °C) 103 20 109/59 95 % -- None (Room air) -- CH    06/30/24 1219 100.4 °F (38 °C) -- -- -- -- -- -- -- CH    06/30/24 1046 99 °F (37.2 °C) -- -- -- -- -- -- -- CH    06/30/24 0906 98.5 °F (36.9 °C) 92 18 121/69 97 % -- None (Room air) -- CH

## 2024-07-01 NOTE — PLAN OF CARE
No acute changes overnight. PRN tylenol given for fever. No fever afterwards. Safety precautions in place, call light within reach.  Problem: Patient Centered Care  Goal: Patient preferences are identified and integrated in the patient's plan of care  Description: Interventions:  - What would you like us to know as we care for you? From home with family  - Provide timely, complete, and accurate information to patient/family  - Incorporate patient and family knowledge, values, beliefs, and cultural backgrounds into the planning and delivery of care  - Encourage patient/family to participate in care and decision-making at the level they choose  - Honor patient and family perspectives and choices  Outcome: Progressing     Problem: Patient/Family Goals  Goal: Patient/Family Long Term Goal  Description: Patient's Long Term Goal: go home    Interventions:  - Monitor vital signs  - Monitor appropriate labs  - Administer medications per order  - Pain management as needed  - Follow MD orders  - Diagnostics per orders  - Discharge planning     - See additional Care Plan goals for specific interventions  Outcome: Progressing  Goal: Patient/Family Short Term Goal  Description: Patient's Short Term Goal: to not have fever    Interventions:   - PRN meds  -follow md orders  - See additional Care Plan goals for specific interventions  Outcome: Progressing     Problem: RISK FOR INFECTION - ADULT  Goal: Absence of fever/infection during anticipated neutropenic period  Description: INTERVENTIONS  - Monitor WBC  - Administer growth factors as ordered  - Implement neutropenic guidelines  Outcome: Progressing     Problem: DISCHARGE PLANNING  Goal: Discharge to home or other facility with appropriate resources  Description: INTERVENTIONS:  - Identify barriers to discharge w/pt and caregiver  - Include patient/family/discharge partner in discharge planning  - Arrange for needed discharge resources and transportation as appropriate  -  Identify discharge learning needs (meds, wound care, etc)  - Arrange for interpreters to assist at discharge as needed  - Consider post-discharge preferences of patient/family/discharge partner  - Complete POLST form as appropriate  - Assess patient's ability to be responsible for managing their own health  - Refer to Case Management Department for coordinating discharge planning if the patient needs post-hospital services based on physician/LIP order or complex needs related to functional status, cognitive ability or social support system  Outcome: Progressing     Problem: GENITOURINARY - ADULT  Goal: Absence of urinary retention  Description: INTERVENTIONS:  - Assess patient’s ability to void and empty bladder  - Monitor intake/output and perform bladder scan as needed  - Follow urinary retention protocol/standard of care  - Consider collaborating with pharmacy to review patient's medication profile  - Implement strategies to promote bladder emptying  Outcome: Progressing

## 2024-07-02 NOTE — PLAN OF CARE
IV removed. Discharge paperwork discussed with patient; Verbalized understanding. Patient discharging self to home.    Problem: Patient Centered Care  Goal: Patient preferences are identified and integrated in the patient's plan of care  Description: Interventions:  - What would you like us to know as we care for you? From home with family  - Provide timely, complete, and accurate information to patient/family  - Incorporate patient and family knowledge, values, beliefs, and cultural backgrounds into the planning and delivery of care  - Encourage patient/family to participate in care and decision-making at the level they choose  - Honor patient and family perspectives and choices  Outcome: Adequate for Discharge     Problem: Patient/Family Goals  Goal: Patient/Family Long Term Goal  Description: Patient's Long Term Goal: go home    Interventions:  - Monitor vital signs  - Monitor appropriate labs  - Administer medications per order  - Pain management as needed  - Follow MD orders  - Diagnostics per orders  - Discharge planning     - See additional Care Plan goals for specific interventions  Outcome: Adequate for Discharge  Goal: Patient/Family Short Term Goal  Description: Patient's Short Term Goal: to not have fever    Interventions:   - PRN meds  -follow md orders  - See additional Care Plan goals for specific interventions  Outcome: Adequate for Discharge     Problem: RISK FOR INFECTION - ADULT  Goal: Absence of fever/infection during anticipated neutropenic period  Description: INTERVENTIONS  - Monitor WBC  - Administer growth factors as ordered  - Implement neutropenic guidelines  Outcome: Adequate for Discharge     Problem: DISCHARGE PLANNING  Goal: Discharge to home or other facility with appropriate resources  Description: INTERVENTIONS:  - Identify barriers to discharge w/pt and caregiver  - Include patient/family/discharge partner in discharge planning  - Arrange for needed discharge resources and  transportation as appropriate  - Identify discharge learning needs (meds, wound care, etc)  - Arrange for interpreters to assist at discharge as needed  - Consider post-discharge preferences of patient/family/discharge partner  - Complete POLST form as appropriate  - Assess patient's ability to be responsible for managing their own health  - Refer to Case Management Department for coordinating discharge planning if the patient needs post-hospital services based on physician/LIP order or complex needs related to functional status, cognitive ability or social support system  Outcome: Adequate for Discharge     Problem: GENITOURINARY - ADULT  Goal: Absence of urinary retention  Description: INTERVENTIONS:  - Assess patient’s ability to void and empty bladder  - Monitor intake/output and perform bladder scan as needed  - Follow urinary retention protocol/standard of care  - Consider collaborating with pharmacy to review patient's medication profile  - Implement strategies to promote bladder emptying  Outcome: Adequate for Discharge

## 2024-07-02 NOTE — PROGRESS NOTES
[facility]    Progress Note    Timi Hardin Patient Status:  Inpatient    1995 MRN [id number]   Location [facility] 5SW/SE Attending Dev Damon MD   Hosp Day # 2 PCP None Pcp       Subjective:   Timi Hardin is a(n) 29 year old male abdominal pain pyelonephritis.    Objective:     Vitals:    24 2137   BP: 130/77   Pulse: 76   Resp: 18   Temp: 98.4 °F (36.9 °C)       Intake/Output Summary (Last 24 hours) at 2024 2332  Last data filed at 2024 0540  Gross per 24 hour   Intake 369 ml   Output --   Net 369 ml     Wt Readings from Last 2 Encounters:   24 243 lb 11.2 oz (110.5 kg)   23 225 lb (102.1 kg)       General appearance:  alert, appears stated age, and cooperative  Head: Normocephalic, without obvious abnormality, atraumatic  Eyes: conjunctivae/corneas clear. PERRL, EOM's intact. Fundi benign.  Neck: no adenopathy, no carotid bruit, no JVD, supple, symmetrical, trachea midline, and thyroid not enlarged, symmetric, no tenderness/mass/nodules  Pulmonary: clear to auscultation bilaterally  Cardiovascular: S1, S2 normal, no murmur, click, rub or gallop, regular rate and rhythm  Abdominal: soft, non-tender; bowel sounds normal; no masses,  no organomegaly  Extremities: extremities normal, atraumatic, no cyanosis or edema  Pulses: 2+ and symmetric  Neurologic: Grossly normal  Genital Exam: defer exam    Current Medications:    Current Facility-Administered Medications:     piperacillin-tazobactam (Zosyn) 3.375 g in dextrose 5% 100 mL IVPB-ADDV, 3.375 g, Intravenous, Q8H    acetaminophen (Tylenol Extra Strength) tab 500 mg, 500 mg, Oral, Q6H PRN    sodium chloride 0.9 % IV bolus 1,000 mL, 1,000 mL, Intravenous, Once    piperacillin-tazobactam (Zosyn) 4.5 g in dextrose 5% 100 mL IVPB-ADDV, 4.5 g, Intravenous, Once    FLUoxetine (PROzac) cap 40 mg, 40 mg, Oral, Daily    zolpidem (Ambien) tab 10 mg, 10 mg, Oral, Nightly PRN     morphINE PF 2 MG/ML injection 1 mg, 1 mg, Intravenous, Q2H PRN **OR** morphINE PF 2 MG/ML injection 2 mg, 2 mg, Intravenous, Q2H PRN **OR** morphINE PF 4 MG/ML injection 4 mg, 4 mg, Intravenous, Q2H PRN    heparin (Porcine) 5000 UNIT/ML injection 5,000 Units, 5,000 Units, Subcutaneous, Q8H HUMBERTO    acetaminophen (Tylenol Extra Strength) tab 1,000 mg, 1,000 mg, Oral, Q6H PRN    ondansetron (Zofran) 4 MG/2ML injection 4 mg, 4 mg, Intravenous, Q6H PRN    metoclopramide (Reglan) tab 10 mg, 10 mg, Oral, Q6H PRN    pantoprazole (Protonix) 40 mg in sodium chloride 0.9% PF 10 mL IV push, 40 mg, Intravenous, QAM AC **OR** pantoprazole (Protonix) DR tab 40 mg, 40 mg, Oral, QAM AC    sodium chloride 0.9% infusion, , Intravenous, Continuous    Allergies  No Known Allergies    Assessment and Plan:     Urinary tract infection associated with nephrostomy catheter, initial encounter ([facility])  Patient has complicated urinary tract infection in the setting of left ureteric stent.  Urology has been consulted infectious disease has been consulted patient will receive appropriate antibiotics will likely need duration of treatment to be 2 weeks or more.     Anxiety: Patient is on mirtazapine alprazolam and fluoxetine.     GI prophylaxis: Patient will be on Protonix.      July 1, 2024  Patient seems to be stable IV antibiotics have been stopped patient started on oral antibiotic with cefadroxil and doxycycline as per infectious disease recommendation  CELI: Has improved  Blood culture have so far been negative.  Urine with staph species but not MRSA.  Discussed at length with patient and patient understands the necessity to follow-up upon discharge with his primary care doctor.    Results:     Lab Results   Component Value Date    WBC 7.9 06/30/2024    HGB 12.1 (L) 06/30/2024    HCT 35.9 (L) 06/30/2024    .0 06/30/2024     06/30/2024    K 4.2 07/01/2024     06/30/2024    CO2 23.0 06/30/2024    CREATSERUM 0.81 06/30/2024     BUN 8 (L) 06/30/2024     (H) 06/30/2024    CA 8.3 (L) 06/30/2024    ALB 3.8 06/30/2024    ALKPHO 62 06/30/2024    BILT 0.8 06/30/2024    TP 7.3 06/30/2024    AST 21 06/30/2024    ALT 12 06/30/2024    LIP 18 07/19/2023    MG 1.8 07/01/2024    PHOS 2.7 07/01/2024     Hospital Encounter on 06/29/24   1. Blood Culture     Status: None (Preliminary result)    Collection Time: 06/30/24 11:23 AM    Specimen: Blood,peripheral   Result Value Ref Range    Blood Culture Result No Growth 1 Day N/A   2. Urine Culture, Routine     Status: Abnormal    Collection Time: 06/29/24  7:00 PM    Specimen: Urine, clean catch   Result Value Ref Range    Urine Culture >100,000 CFU/ML Staphylococcus species, not aureus (A) N/A       No results found.                ANDREAS CALDERA MD  7/1/2024

## (undated) DEVICE — VIAL ISOVUE 300 10X100ML

## (undated) DEVICE — SOLO FLEX HYBRID GUIDEWIRE .03

## (undated) DEVICE — TIGERTAIL 5F FLXTIP 70CM

## (undated) DEVICE — GAMMEX® PI HYBRID SIZE 7.5, STERILE POWDER-FREE SURGICAL GLOVE, POLYISOPRENE AND NEOPRENE BLEND: Brand: GAMMEX

## (undated) DEVICE — STERILE WATER 1000ML BTL

## (undated) DEVICE — CYSTO PACK: Brand: MEDLINE INDUSTRIES, INC.

## (undated) DEVICE — SCD SLEEVE KNEE LRG BLEND

## (undated) DEVICE — TUBING CYSTO TUR DUAL

## (undated) DEVICE — GOWN SURG AERO BLUE PERF XLG

## (undated) DEVICE — SOLUTION  .9 3000ML

## (undated) NOTE — LETTER
[address]  Authorization for Surgical Operation and Procedure                                                                                           I hereby authorize Riki Brooke MD, my physician and his/her assistants (if applicable), which may include medical students, residents, and/or fellows, to perform the following surgical operation/ procedure and administer such anesthesia as may be determined necessary by my physician: Operation/Procedure name (s) CYSTOSCOPY STENT INSERTION on Tre Footman   2. I recognize that during the surgical operation/procedure, unforeseen conditions may necessitate additional or different procedures than those listed above. I, therefore, further authorize and request that the above-named surgeon, assistants, or designees perform such procedures as are, in their judgment, necessary and desirable. 3.   My surgeon/physician has discussed prior to my surgery the potential benefits, risks and side effects of this procedure; the likelihood of achieving goals; and potential problems that might occur during recuperation. They also discussed reasonable alternatives to the procedure, including risks, benefits, and side effects related to the alternatives and risks related to not receiving this procedure. I have had all my questions answered and I acknowledge that no guarantee has been made as to the result that may be obtained. 4.   Should the need arise during my operation/procedure, which includes change of level of care prior to discharge, I also consent to the administration of blood and/or blood products. Further, I understand that despite careful testing and screening of blood or blood products by collecting agencies, I may still be subject to ill effects as a result of receiving a blood transfusion and/or blood products.   The following are some, but not all, of the potential risks that can occur: fever and allergic reactions, hemolytic reactions, transmission of diseases such as Hepatitis, AIDS and Cytomegalovirus (CMV) and fluid overload. In the event that I wish to have an autologous transfusion of my own blood, or a directed donor transfusion, I will discuss this with my physician. Check only if Refusing Blood or Blood Products  I understand refusal of blood or blood products as deemed necessary by my physician may have serious consequences to my condition to include possible death. I hereby assume responsibility for my refusal and release the hospital, its personnel, and my physicians from any responsibility for the consequences of my refusal.    o  Refuse   5. I authorize the use of any specimen, organs, tissues, body parts or foreign objects that may be removed from my body during the operation/procedure for diagnosis, research or teaching purposes and their subsequent disposal by hospital authorities. I also authorize the release of specimen test results and/or written reports to my treating physician on the hospital medical staff or other referring or consulting physicians involved in my care, at the discretion of the Pathologist or my treating physician. 6.   I consent to the photographing or videotaping of the operations or procedures to be performed, including appropriate portions of my body for medical, scientific, or educational purposes, provided my identity is not revealed by the pictures or by descriptive texts accompanying them. If the procedure has been photographed/videotaped, the surgeon will obtain the original picture, image, videotape or CD. The hospital will not be responsible for storage, release or maintenance of the picture, image, tape or CD.    7.   I consent to the presence of a  or observers in the operating room as deemed necessary by my physician or their designees.     8.   I recognize that in the event my procedure results in extended X-Ray/fluoroscopy time, I may develop a skin reaction. 9. If I have a Do Not Attempt Resuscitation (DNAR) order in place, that status will be suspended while in the operating room, procedural suite, and during the recovery period unless otherwise explicitly stated by me (or a person authorized to consent on my behalf). The surgeon or my attending physician will determine when the applicable recovery period ends for purposes of reinstating the DNAR order. 10. Patients having a sterilization procedure: I understand that if the procedure is successful the results will be permanent and it will therefore be impossible for me to inseminate, conceive, or bear children. I also understand that the procedure is intended to result in sterility, although the result has not been guaranteed. 11. I acknowledge that my physician has explained sedation/analgesia administration to me including the risk and benefits I consent to the administration of sedation/analgesia as may be necessary or desirable in the judgment of my physician. I CERTIFY THAT I HAVE READ AND FULLY UNDERSTAND THE ABOVE CONSENT TO OPERATION and/or OTHER PROCEDURE.     _________________________________________ _________________________________     ___________________________________  Signature of Patient     Signature of Responsible Person                   Printed Name of Responsible Person                              _________________________________________ ______________________________        ___________________________________  Signature of Witness         Date  Time         Relationship to Patient    STATEMENT OF PHYSICIAN My signature below affirms that prior to the time of the procedure; I have explained to the patient and/or his/her legal representative, the risks and benefits involved in the proposed treatment and any reasonable alternative to the proposed treatment.  I have also explained the risks and benefits involved in refusal of the proposed treatment and alternatives to the proposed treatment and have answered the patient's questions.  If I have a significant financial interest in a co-management agreement or a significant financial interest in any product or implant, or other significant relationship used in this procedure/surgery, I have disclosed this and had a discussion with my patient.     _______________________________________________________________ _____________________________  Coit Irineo of Physician)                                                                                         (Date)                                   (Time)  Patient Name: Kashmir Schmidt    : 1995   Printed: 2023      Medical Record #: [id number]                                              Page 1 of 1